# Patient Record
Sex: FEMALE | Race: WHITE | URBAN - METROPOLITAN AREA
[De-identification: names, ages, dates, MRNs, and addresses within clinical notes are randomized per-mention and may not be internally consistent; named-entity substitution may affect disease eponyms.]

---

## 2023-05-31 ENCOUNTER — HOSPITAL ENCOUNTER (OUTPATIENT)
Dept: MRI IMAGING | Age: 58
Discharge: HOME | End: 2023-06-02
Payer: COMMERCIAL

## 2023-05-31 DIAGNOSIS — M54.50 LOW BACK PAIN, UNSPECIFIED BACK PAIN LATERALITY, UNSPECIFIED CHRONICITY, UNSPECIFIED WHETHER SCIATICA PRESENT: ICD-10-CM

## 2023-05-31 DIAGNOSIS — M54.16 LUMBAR RADICULOPATHY: ICD-10-CM

## 2023-05-31 DIAGNOSIS — S32.020A CLOSED COMPRESSION FRACTURE OF L2 LUMBAR VERTEBRA, INITIAL ENCOUNTER (HCC): ICD-10-CM

## 2023-05-31 PROCEDURE — 72148 MRI LUMBAR SPINE W/O DYE: CPT

## 2023-06-06 ENCOUNTER — INITIAL CONSULT (OUTPATIENT)
Dept: NEUROSURGERY | Age: 58
End: 2023-06-06
Payer: COMMERCIAL

## 2023-06-06 VITALS
HEIGHT: 62 IN | TEMPERATURE: 97.6 F | DIASTOLIC BLOOD PRESSURE: 80 MMHG | BODY MASS INDEX: 36.8 KG/M2 | WEIGHT: 200 LBS | SYSTOLIC BLOOD PRESSURE: 128 MMHG

## 2023-06-06 DIAGNOSIS — M51.26 LUMBAR DISC HERNIATION: Primary | ICD-10-CM

## 2023-06-06 PROCEDURE — 99203 OFFICE O/P NEW LOW 30 MIN: CPT | Performed by: NEUROLOGICAL SURGERY

## 2023-06-06 RX ORDER — HYDROCHLOROTHIAZIDE 12.5 MG/1
CAPSULE, GELATIN COATED ORAL
COMMUNITY

## 2023-06-06 RX ORDER — CALCIUM CARBONATE 500(1250)
500 TABLET ORAL DAILY
COMMUNITY

## 2023-06-06 RX ORDER — ASPIRIN 81 MG/1
81 TABLET ORAL DAILY
COMMUNITY

## 2023-06-06 RX ORDER — MAGNESIUM 30 MG
30 TABLET ORAL 2 TIMES DAILY
COMMUNITY

## 2023-06-06 RX ORDER — ROSUVASTATIN CALCIUM 5 MG/1
TABLET, COATED ORAL
COMMUNITY

## 2023-06-06 ASSESSMENT — ENCOUNTER SYMPTOMS
NAUSEA: 0
DIARRHEA: 0
BACK PAIN: 1
EYE PAIN: 0
CONSTIPATION: 0
SHORTNESS OF BREATH: 0

## 2023-10-20 ENCOUNTER — LAB (OUTPATIENT)
Dept: LAB | Facility: LAB | Age: 58
End: 2023-10-20
Payer: COMMERCIAL

## 2023-10-20 DIAGNOSIS — E55.9 VITAMIN D DEFICIENCY, UNSPECIFIED: ICD-10-CM

## 2023-10-20 DIAGNOSIS — E78.2 MIXED HYPERLIPIDEMIA: ICD-10-CM

## 2023-10-20 DIAGNOSIS — I10 ESSENTIAL (PRIMARY) HYPERTENSION: ICD-10-CM

## 2023-10-20 DIAGNOSIS — Z00.00 ENCOUNTER FOR GENERAL ADULT MEDICAL EXAMINATION WITHOUT ABNORMAL FINDINGS: Primary | ICD-10-CM

## 2023-10-20 DIAGNOSIS — Z00.00 ENCOUNTER FOR GENERAL ADULT MEDICAL EXAMINATION WITHOUT ABNORMAL FINDINGS: ICD-10-CM

## 2023-10-20 LAB
25(OH)D3 SERPL-MCNC: 78 NG/ML (ref 30–100)
ALBUMIN SERPL BCP-MCNC: 4.4 G/DL (ref 3.4–5)
ALP SERPL-CCNC: 68 U/L (ref 33–110)
ALT SERPL W P-5'-P-CCNC: 17 U/L (ref 7–45)
ANION GAP SERPL CALC-SCNC: 12 MMOL/L (ref 10–20)
AST SERPL W P-5'-P-CCNC: 18 U/L (ref 9–39)
BILIRUB SERPL-MCNC: 0.8 MG/DL (ref 0–1.2)
BUN SERPL-MCNC: 15 MG/DL (ref 6–23)
CALCIUM SERPL-MCNC: 9.5 MG/DL (ref 8.6–10.3)
CHLORIDE SERPL-SCNC: 99 MMOL/L (ref 98–107)
CHOLEST SERPL-MCNC: 164 MG/DL (ref 0–199)
CHOLESTEROL/HDL RATIO: 3.5
CO2 SERPL-SCNC: 33 MMOL/L (ref 21–32)
CREAT SERPL-MCNC: 0.68 MG/DL (ref 0.5–1.05)
ERYTHROCYTE [DISTWIDTH] IN BLOOD BY AUTOMATED COUNT: 12.8 % (ref 11.5–14.5)
GFR SERPL CREATININE-BSD FRML MDRD: >90 ML/MIN/1.73M*2
GLUCOSE SERPL-MCNC: 122 MG/DL (ref 74–99)
HCT VFR BLD AUTO: 45.5 % (ref 36–46)
HDLC SERPL-MCNC: 46.4 MG/DL
HGB BLD-MCNC: 15 G/DL (ref 12–16)
LDLC SERPL CALC-MCNC: 91 MG/DL
MCH RBC QN AUTO: 31 PG (ref 26–34)
MCHC RBC AUTO-ENTMCNC: 33 G/DL (ref 32–36)
MCV RBC AUTO: 94 FL (ref 80–100)
NON HDL CHOLESTEROL: 118 MG/DL (ref 0–149)
NRBC BLD-RTO: 0 /100 WBCS (ref 0–0)
PLATELET # BLD AUTO: 215 X10*3/UL (ref 150–450)
PMV BLD AUTO: 10.6 FL (ref 7.5–11.5)
POTASSIUM SERPL-SCNC: 3.7 MMOL/L (ref 3.5–5.3)
PROT SERPL-MCNC: 7.1 G/DL (ref 6.4–8.2)
RBC # BLD AUTO: 4.84 X10*6/UL (ref 4–5.2)
SODIUM SERPL-SCNC: 140 MMOL/L (ref 136–145)
TRIGL SERPL-MCNC: 133 MG/DL (ref 0–149)
TSH SERPL-ACNC: 4.64 MIU/L (ref 0.44–3.98)
VLDL: 27 MG/DL (ref 0–40)
WBC # BLD AUTO: 4.3 X10*3/UL (ref 4.4–11.3)

## 2023-10-20 PROCEDURE — 84443 ASSAY THYROID STIM HORMONE: CPT

## 2023-10-20 PROCEDURE — 80053 COMPREHEN METABOLIC PANEL: CPT

## 2023-10-20 PROCEDURE — 82306 VITAMIN D 25 HYDROXY: CPT

## 2023-10-20 PROCEDURE — 85027 COMPLETE CBC AUTOMATED: CPT

## 2023-10-20 PROCEDURE — 80061 LIPID PANEL: CPT

## 2023-10-20 PROCEDURE — 36415 COLL VENOUS BLD VENIPUNCTURE: CPT

## 2023-10-26 PROBLEM — J30.9 ALLERGIC RHINITIS: Status: ACTIVE | Noted: 2023-10-26

## 2023-10-26 PROBLEM — A49.9 UTI (URINARY TRACT INFECTION), BACTERIAL: Status: ACTIVE | Noted: 2023-10-26

## 2023-10-26 PROBLEM — E55.9 VITAMIN D DEFICIENCY: Status: ACTIVE | Noted: 2023-10-26

## 2023-10-26 PROBLEM — H81.09 MENIERE DISEASE: Status: ACTIVE | Noted: 2023-10-26

## 2023-10-26 PROBLEM — H92.02 LEFT EAR PAIN: Status: ACTIVE | Noted: 2023-10-26

## 2023-10-26 PROBLEM — E66.9 OBESITY (BMI 30-39.9): Status: ACTIVE | Noted: 2023-10-26

## 2023-10-26 PROBLEM — N39.0 UTI (URINARY TRACT INFECTION), BACTERIAL: Status: ACTIVE | Noted: 2023-10-26

## 2023-10-26 PROBLEM — K21.9 GERD WITHOUT ESOPHAGITIS: Status: ACTIVE | Noted: 2023-10-26

## 2023-10-26 PROBLEM — F41.9 ANXIETY: Status: ACTIVE | Noted: 2023-10-26

## 2023-10-26 PROBLEM — M62.830 SPASM OF PARASPINAL MUSCLE: Status: ACTIVE | Noted: 2023-10-26

## 2023-10-26 PROBLEM — E78.2 MIXED HYPERLIPIDEMIA: Status: ACTIVE | Noted: 2023-10-26

## 2023-10-26 PROBLEM — S32.020A COMPRESSION FRACTURE OF L2 VERTEBRA (MULTI): Status: ACTIVE | Noted: 2023-10-26

## 2023-10-26 PROBLEM — M54.16 LUMBAR RADICULOPATHY, ACUTE: Status: ACTIVE | Noted: 2023-10-26

## 2023-10-26 PROBLEM — H71.90: Status: ACTIVE | Noted: 2023-10-26

## 2023-10-26 PROBLEM — I65.23 BILATERAL CAROTID ARTERY STENOSIS: Status: ACTIVE | Noted: 2023-10-26

## 2023-10-26 PROBLEM — H90.3 BILATERAL HIGH FREQUENCY SENSORINEURAL HEARING LOSS: Status: ACTIVE | Noted: 2023-10-26

## 2023-10-26 PROBLEM — D23.21 BENIGN NEOPLASM OF RIGHT EAR: Status: ACTIVE | Noted: 2023-10-26

## 2023-10-26 RX ORDER — HYDROCHLOROTHIAZIDE 12.5 MG/1
12.5 CAPSULE ORAL DAILY
COMMUNITY
End: 2024-01-02 | Stop reason: SDUPTHER

## 2023-10-26 RX ORDER — PREDNISONE 20 MG/1
20 TABLET ORAL
COMMUNITY
End: 2023-10-30 | Stop reason: WASHOUT

## 2023-10-26 RX ORDER — FLUTICASONE PROPIONATE 50 MCG
2 SPRAY, SUSPENSION (ML) NASAL DAILY
COMMUNITY
Start: 2021-03-30

## 2023-10-26 RX ORDER — ROSUVASTATIN CALCIUM 5 MG/1
1 TABLET, COATED ORAL NIGHTLY
COMMUNITY
Start: 2021-09-29 | End: 2024-02-19 | Stop reason: SDUPTHER

## 2023-10-26 RX ORDER — THEANINE 100 MG
1 CAPSULE ORAL EVERY OTHER DAY
COMMUNITY
Start: 2020-03-02

## 2023-10-26 RX ORDER — CALCIUM CARBONATE 300MG(750)
400 TABLET,CHEWABLE ORAL
COMMUNITY
Start: 2020-03-02

## 2023-10-26 RX ORDER — ASPIRIN 81 MG/1
1 TABLET ORAL DAILY
COMMUNITY

## 2023-10-26 RX ORDER — CALCIUM CARBONATE/VITAMIN D3 600MG-5MCG
1 TABLET ORAL DAILY
COMMUNITY
Start: 2020-03-02

## 2023-10-26 RX ORDER — PANTOPRAZOLE SODIUM 40 MG/1
1 TABLET, DELAYED RELEASE ORAL DAILY PRN
COMMUNITY

## 2023-10-26 RX ORDER — MONTELUKAST SODIUM 10 MG/1
10 TABLET ORAL DAILY
COMMUNITY
Start: 2019-10-24 | End: 2024-02-19 | Stop reason: SDUPTHER

## 2023-10-26 RX ORDER — ACETAMINOPHEN 500 MG
2000 TABLET ORAL DAILY
COMMUNITY

## 2023-10-30 ENCOUNTER — OFFICE VISIT (OUTPATIENT)
Dept: PRIMARY CARE | Facility: CLINIC | Age: 58
End: 2023-10-30
Payer: COMMERCIAL

## 2023-10-30 ENCOUNTER — PREP FOR PROCEDURE (OUTPATIENT)
Dept: OTOLARYNGOLOGY | Facility: CLINIC | Age: 58
End: 2023-10-30

## 2023-10-30 ENCOUNTER — OFFICE VISIT (OUTPATIENT)
Dept: OTOLARYNGOLOGY | Facility: CLINIC | Age: 58
End: 2023-10-30
Payer: COMMERCIAL

## 2023-10-30 VITALS
DIASTOLIC BLOOD PRESSURE: 100 MMHG | TEMPERATURE: 97.5 F | HEIGHT: 62 IN | WEIGHT: 190 LBS | HEART RATE: 86 BPM | BODY MASS INDEX: 34.96 KG/M2 | SYSTOLIC BLOOD PRESSURE: 140 MMHG

## 2023-10-30 DIAGNOSIS — R79.89 ABNORMAL TSH: Primary | ICD-10-CM

## 2023-10-30 DIAGNOSIS — N30.00 ACUTE CYSTITIS WITHOUT HEMATURIA: ICD-10-CM

## 2023-10-30 DIAGNOSIS — D23.21 BENIGN NEOPLASM OF SKIN OF RIGHT EAR AND EXTERNAL AUDITORY MEATUS: Primary | ICD-10-CM

## 2023-10-30 DIAGNOSIS — Z12.31 SCREENING MAMMOGRAM FOR BREAST CANCER: ICD-10-CM

## 2023-10-30 DIAGNOSIS — H61.899 LESION OF EAR CANAL: ICD-10-CM

## 2023-10-30 DIAGNOSIS — D23.21 BENIGN NEOPLASM OF SKIN OF RIGHT EAR AND EXTERNAL AUDITORY MEATUS: ICD-10-CM

## 2023-10-30 DIAGNOSIS — H92.02 LEFT EAR PAIN: Primary | ICD-10-CM

## 2023-10-30 PROCEDURE — 1036F TOBACCO NON-USER: CPT | Performed by: INTERNAL MEDICINE

## 2023-10-30 PROCEDURE — 99214 OFFICE O/P EST MOD 30 MIN: CPT | Performed by: OTOLARYNGOLOGY

## 2023-10-30 PROCEDURE — 3080F DIAST BP >= 90 MM HG: CPT | Performed by: INTERNAL MEDICINE

## 2023-10-30 PROCEDURE — 99214 OFFICE O/P EST MOD 30 MIN: CPT | Performed by: INTERNAL MEDICINE

## 2023-10-30 PROCEDURE — 3077F SYST BP >= 140 MM HG: CPT | Performed by: INTERNAL MEDICINE

## 2023-10-30 ASSESSMENT — ENCOUNTER SYMPTOMS
DEPRESSION: 0
OCCASIONAL FEELINGS OF UNSTEADINESS: 0
LOSS OF SENSATION IN FEET: 0

## 2023-10-30 ASSESSMENT — PATIENT HEALTH QUESTIONNAIRE - PHQ9
1. LITTLE INTEREST OR PLEASURE IN DOING THINGS: NOT AT ALL
2. FEELING DOWN, DEPRESSED OR HOPELESS: NOT AT ALL
SUM OF ALL RESPONSES TO PHQ9 QUESTIONS 1 AND 2: 0

## 2023-10-30 NOTE — PROGRESS NOTES
Ivy Ojeda is a 58 y.o. year old female patient with Earache     Patient presents to the office today for assessment of both left and right ear.  Patient with persistent pain in the left ear along with a history of cystic lesion in the right ear canal.  All other ENT issues are negative.  There have been no significant changes in past medical or past surgical histories except as mentioned.        Physical exam:   General appearance: No acute distress. Normal facies. Symmetric facial movement. No gross lesions of the face are noted.  Patient with cystic lesion in the right ear canal.  The ear canals patent and the tympanic membranes are intact without evidence of air-fluid levels, retraction, or congenital defects.  Anterior rhinoscopy notes essentially a midline nasal septum. Examination is noted for normal healthy mucosal membranes without any evidence of lesions, polyps, or exudate. The tongue is normally mobile. There are no lesions on the gingiva, buccal, or oral mucosa. There are no oral cavity masses.  The neck is negative for mass lymphadenopathy. The trachea and parotid are clear. The thyroid bed is grossly unremarkable. The salivary gland structures are grossly unremarkable.      Assessment/Plan   1.  Persistent left ear pain  2.  Right ear canal lesion    Patient seen in the office today for assessment of ears.  Patient with persistent left ear pain as well as cyst present in the right ear canal.  At this time the patient is going to be set up for CT IAC temporal bone for persistent left ear pain and possible prior history of cholesteatoma as well as set up for surgical excision of right ear canal lesion.  We will see the patient back to review CT.

## 2023-10-30 NOTE — PROGRESS NOTES
"In SUBJECTIVE:   Ivy Ojeda is a 58 y.o. female who presents for Follow-up (6 month).    HISTORY OF PRESENT ILLNESS:  Ivy Ojeda  is a pleasant 58-year-old female comes here for 6-month follow-up.  She is otherwise pretty healthy.  She does have hypercholesterolemia for which she is on rosuvastatin.  She has gastroesophageal reflux disorder which is well controlled by PPI.  She had recent blood work which did show slightly abnormal TSH.  I did not start any medications.  I like to repeat this lab including T3 and T4.  Otherwise she is doing well.  She needs her mammogram done.  Patient complained about this.  We will send her urine sample.            Review of Systems   Constitutional:  Negative for activity change and fever.   HENT:  Negative for hearing loss, nosebleeds and tinnitus.    Eyes:  Negative for redness.   Respiratory:  Negative for chest tightness and stridor.    Cardiovascular:  Negative for chest pain, palpitations and leg swelling.   Gastrointestinal:  Negative for blood in stool.   Endocrine: Negative for cold intolerance.   Genitourinary:  Negative for hematuria.   Musculoskeletal:  Negative for joint swelling.   Skin:  Negative for rash.   Neurological:  Negative for speech difficulty and light-headedness.   Psychiatric/Behavioral:  Negative for behavioral problems.        Visit Vitals  BP (!) 140/100 (BP Location: Left arm, Patient Position: Sitting, BP Cuff Size: Large adult)   Pulse 86   Temp 36.4 °C (97.5 °F) (Temporal)   Ht 1.575 m (5' 2\")   Wt 86.2 kg (190 lb)   BMI 34.75 kg/m²   Smoking Status Former   BSA 1.94 m²             Wt Readings from Last 10 Encounters:   10/30/23 86.2 kg (190 lb)   07/24/23 86.8 kg (191 lb 4 oz)   05/01/23 84.8 kg (187 lb)   01/26/23 82.1 kg (181 lb)   11/15/22 81.2 kg (179 lb)   04/11/22 79.3 kg (174 lb 14.4 oz)   02/09/22 76.8 kg (169 lb 4.8 oz)   12/08/21 72.6 kg (160 lb)   11/05/21 73.5 kg (162 lb)   03/30/21 84.5 kg (186 lb 4.8 oz)    "         Physical Exam  Constitutional:       General: She is not in acute distress.     Appearance: Normal appearance.   HENT:      Head: Normocephalic.      Right Ear: Tympanic membrane normal.      Left Ear: Tympanic membrane normal.      Mouth/Throat:      Mouth: Mucous membranes are moist.   Cardiovascular:      Rate and Rhythm: Normal rate and regular rhythm.      Heart sounds: No murmur heard.  Pulmonary:      Effort: No respiratory distress.   Abdominal:      Palpations: Abdomen is soft.   Musculoskeletal:      Cervical back: Neck supple.      Right lower leg: No edema.      Left lower leg: No edema.   Skin:     Findings: No rash.   Neurological:      General: No focal deficit present.      Mental Status: She is alert and oriented to person, place, and time.   Psychiatric:         Mood and Affect: Mood normal.            RECENT LABS:  Lab Results   Component Value Date    WBC 4.3 (L) 10/20/2023    HGB 15.0 10/20/2023    HCT 45.5 10/20/2023     10/20/2023    CHOL 164 10/20/2023    TRIG 133 10/20/2023    HDL 46.4 10/20/2023    ALT 17 10/20/2023    AST 18 10/20/2023     10/20/2023    K 3.7 10/20/2023    CL 99 10/20/2023    CREATININE 0.68 10/20/2023    BUN 15 10/20/2023    CO2 33 (H) 10/20/2023    TSH 4.64 (H) 10/20/2023    HGBA1C 5.6 11/13/2021       MEDICATIONS:   Current Outpatient Medications   Medication Instructions    aspirin (Adult Low Dose Aspirin) 81 mg EC tablet 1 tablet, oral, Daily    calcium carbonate-vitamin D3 600 mg-5 mcg (200 unit) tablet 1 tablet, oral, Daily    cholecalciferol (VITAMIN D-3) 2,000 Units, oral, Daily    fish oil concentrate (Omega-3) 120-180 mg capsule 1 capsule, oral, Daily    fluticasone (Flonase) 50 mcg/actuation nasal spray 2 sprays, Each Nostril, Daily    hydroCHLOROthiazide (MICROZIDE) 12.5 mg, oral, Daily    magnesium oxide (MAG-OX) 400 mg, oral, Every other day    montelukast (SINGULAIR) 10 mg, oral, Daily    pantoprazole (ProtoNix) 40 mg EC tablet 1  tablet, oral, Daily PRN    rosuvastatin (Crestor) 5 mg tablet 1 tablet, oral, Nightly    theanine 100 mg capsule 1 capsule, oral, Daily        TODAY'S VISIT  DX:   1. Abnormal TSH  TSH with reflex to Free T4 if abnormal    Triiodothyronine, Free      2. Screening mammogram for breast cancer  BI mammo bilateral screening tomosynthesis      3. Acute cystitis without hematuria  Urinalysis with Reflex Microscopic    Urinalysis with Reflex Microscopic    Microscopic Only, Urine             MEDICAL DECISION MAKING:  A. Recent lab work and relevant imaging studies have been reviewed.    B. Relevant correspondence/notes from specialty consultants were reviewed and discussed with patient.    C. The current active medical co morbidities have been considered.   D. Medication have been sent for refill.    E. Patient will continue with current medical therapy and plan.   F. I will see patient back in 3 months.

## 2023-10-31 LAB
APPEARANCE UR: ABNORMAL
BILIRUB UR STRIP.AUTO-MCNC: NEGATIVE MG/DL
COLOR UR: YELLOW
GLUCOSE UR STRIP.AUTO-MCNC: NEGATIVE MG/DL
KETONES UR STRIP.AUTO-MCNC: NEGATIVE MG/DL
LEUKOCYTE ESTERASE UR QL STRIP.AUTO: ABNORMAL
MUCOUS THREADS #/AREA URNS AUTO: NORMAL /LPF
NITRITE UR QL STRIP.AUTO: NEGATIVE
PH UR STRIP.AUTO: 6 [PH]
PROT UR STRIP.AUTO-MCNC: NEGATIVE MG/DL
RBC # UR STRIP.AUTO: NEGATIVE /UL
RBC #/AREA URNS AUTO: NORMAL /HPF
SP GR UR STRIP.AUTO: 1.02
SQUAMOUS #/AREA URNS AUTO: NORMAL /HPF
UROBILINOGEN UR STRIP.AUTO-MCNC: <2 MG/DL
WBC #/AREA URNS AUTO: NORMAL /HPF

## 2023-10-31 PROCEDURE — 81001 URINALYSIS AUTO W/SCOPE: CPT

## 2023-11-02 ASSESSMENT — ENCOUNTER SYMPTOMS
EYE REDNESS: 0
HEMATURIA: 0
JOINT SWELLING: 0
SPEECH DIFFICULTY: 0
STRIDOR: 0
CHEST TIGHTNESS: 0
PALPITATIONS: 0
BLOOD IN STOOL: 0
ACTIVITY CHANGE: 0
LIGHT-HEADEDNESS: 0
FEVER: 0

## 2023-11-17 ENCOUNTER — HOSPITAL ENCOUNTER (OUTPATIENT)
Dept: RADIOLOGY | Facility: HOSPITAL | Age: 58
Discharge: HOME | End: 2023-11-17
Payer: COMMERCIAL

## 2023-11-17 DIAGNOSIS — H92.02 LEFT EAR PAIN: ICD-10-CM

## 2023-11-17 PROCEDURE — 2550000001 HC RX 255 CONTRASTS: Performed by: PHYSICIAN ASSISTANT

## 2023-11-17 PROCEDURE — 70481 CT ORBIT/EAR/FOSSA W/DYE: CPT | Performed by: RADIOLOGY

## 2023-11-17 PROCEDURE — 70481 CT ORBIT/EAR/FOSSA W/DYE: CPT

## 2023-11-17 RX ADMIN — IOHEXOL 75 ML: 350 INJECTION, SOLUTION INTRAVENOUS at 16:00

## 2023-12-01 ENCOUNTER — HOSPITAL ENCOUNTER (OUTPATIENT)
Dept: RADIOLOGY | Facility: HOSPITAL | Age: 58
Discharge: HOME | End: 2023-12-01
Payer: COMMERCIAL

## 2023-12-01 DIAGNOSIS — Z12.31 SCREENING MAMMOGRAM FOR BREAST CANCER: ICD-10-CM

## 2023-12-01 PROCEDURE — 77063 BREAST TOMOSYNTHESIS BI: CPT

## 2023-12-01 PROCEDURE — 77063 BREAST TOMOSYNTHESIS BI: CPT | Performed by: RADIOLOGY

## 2023-12-01 PROCEDURE — 77067 SCR MAMMO BI INCL CAD: CPT | Performed by: RADIOLOGY

## 2023-12-19 RX ORDER — CYANOCOBALAMIN (VITAMIN B-12) 250 MCG
250 TABLET ORAL DAILY
COMMUNITY

## 2023-12-19 RX ORDER — VITAMIN E MIXED 400 UNIT
400 CAPSULE ORAL DAILY
COMMUNITY

## 2023-12-19 NOTE — PREPROCEDURE INSTRUCTIONS
Pre op instructions reviewed with pt including parking/hospital/procedure area location, have  on standby, and day of procedure routine.

## 2023-12-21 ENCOUNTER — HOSPITAL ENCOUNTER (OUTPATIENT)
Facility: HOSPITAL | Age: 58
Setting detail: OUTPATIENT SURGERY
Discharge: HOME | End: 2023-12-21
Attending: OTOLARYNGOLOGY | Admitting: OTOLARYNGOLOGY
Payer: COMMERCIAL

## 2023-12-21 VITALS
DIASTOLIC BLOOD PRESSURE: 86 MMHG | WEIGHT: 184.75 LBS | TEMPERATURE: 96.6 F | OXYGEN SATURATION: 98 % | BODY MASS INDEX: 34 KG/M2 | RESPIRATION RATE: 16 BRPM | HEIGHT: 62 IN | SYSTOLIC BLOOD PRESSURE: 158 MMHG | HEART RATE: 101 BPM

## 2023-12-21 DIAGNOSIS — D23.21 BENIGN NEOPLASM OF SKIN OF RIGHT EAR AND EXTERNAL AUDITORY MEATUS: ICD-10-CM

## 2023-12-21 PROCEDURE — 2500000001 HC RX 250 WO HCPCS SELF ADMINISTERED DRUGS (ALT 637 FOR MEDICARE OP)

## 2023-12-21 PROCEDURE — 88305 TISSUE EXAM BY PATHOLOGIST: CPT | Performed by: PATHOLOGY

## 2023-12-21 PROCEDURE — A4217 STERILE WATER/SALINE, 500 ML: HCPCS | Performed by: OTOLARYNGOLOGY

## 2023-12-21 PROCEDURE — 2720000007 HC OR 272 NO HCPCS: Performed by: OTOLARYNGOLOGY

## 2023-12-21 PROCEDURE — 2500000004 HC RX 250 GENERAL PHARMACY W/ HCPCS (ALT 636 FOR OP/ED): Performed by: OTOLARYNGOLOGY

## 2023-12-21 PROCEDURE — 7100000010 HC PHASE TWO TIME - EACH INCREMENTAL 1 MINUTE: Performed by: OTOLARYNGOLOGY

## 2023-12-21 PROCEDURE — 69145 REMOVE EAR CANAL LESION(S): CPT | Performed by: OTOLARYNGOLOGY

## 2023-12-21 PROCEDURE — 0753T DGTZ GLS MCRSCP SLD LEVEL IV: CPT | Mod: TC,SUR,ELYLAB | Performed by: OTOLARYNGOLOGY

## 2023-12-21 PROCEDURE — 3600000008 HC OR TIME - EACH INCREMENTAL 1 MINUTE - PROCEDURE LEVEL THREE: Performed by: OTOLARYNGOLOGY

## 2023-12-21 PROCEDURE — 7100000009 HC PHASE TWO TIME - INITIAL BASE CHARGE: Performed by: OTOLARYNGOLOGY

## 2023-12-21 PROCEDURE — 2500000005 HC RX 250 GENERAL PHARMACY W/O HCPCS: Performed by: OTOLARYNGOLOGY

## 2023-12-21 PROCEDURE — 3600000003 HC OR TIME - INITIAL BASE CHARGE - PROCEDURE LEVEL THREE: Performed by: OTOLARYNGOLOGY

## 2023-12-21 RX ORDER — CEFDINIR 300 MG/1
300 CAPSULE ORAL 2 TIMES DAILY
Qty: 20 CAPSULE | Refills: 0 | Status: SHIPPED | OUTPATIENT
Start: 2023-12-21 | End: 2023-12-31

## 2023-12-21 RX ORDER — BACITRACIN ZINC 500 UNIT/G
OINTMENT (GRAM) TOPICAL AS NEEDED
Status: DISCONTINUED | OUTPATIENT
Start: 2023-12-21 | End: 2023-12-21 | Stop reason: HOSPADM

## 2023-12-21 RX ORDER — SODIUM CHLORIDE 0.9 G/100ML
IRRIGANT IRRIGATION AS NEEDED
Status: DISCONTINUED | OUTPATIENT
Start: 2023-12-21 | End: 2023-12-21 | Stop reason: HOSPADM

## 2023-12-21 RX ORDER — LIDOCAINE HYDROCHLORIDE AND EPINEPHRINE 10; 10 MG/ML; UG/ML
INJECTION, SOLUTION INFILTRATION; PERINEURAL AS NEEDED
Status: DISCONTINUED | OUTPATIENT
Start: 2023-12-21 | End: 2023-12-21 | Stop reason: HOSPADM

## 2023-12-21 RX ORDER — HYDROGEN PEROXIDE 3 %
20 SOLUTION, NON-ORAL MISCELLANEOUS
COMMUNITY

## 2023-12-21 ASSESSMENT — ENCOUNTER SYMPTOMS
NEUROLOGICAL NEGATIVE: 1
CARDIOVASCULAR NEGATIVE: 1
RESPIRATORY NEGATIVE: 1
CONSTITUTIONAL NEGATIVE: 1

## 2023-12-21 ASSESSMENT — PAIN SCALES - GENERAL
PAINLEVEL_OUTOF10: 0 - NO PAIN
PAINLEVEL_OUTOF10: 0 - NO PAIN

## 2023-12-21 ASSESSMENT — COLUMBIA-SUICIDE SEVERITY RATING SCALE - C-SSRS
1. IN THE PAST MONTH, HAVE YOU WISHED YOU WERE DEAD OR WISHED YOU COULD GO TO SLEEP AND NOT WAKE UP?: NO
2. HAVE YOU ACTUALLY HAD ANY THOUGHTS OF KILLING YOURSELF?: NO
6. HAVE YOU EVER DONE ANYTHING, STARTED TO DO ANYTHING, OR PREPARED TO DO ANYTHING TO END YOUR LIFE?: NO

## 2023-12-21 ASSESSMENT — PAIN - FUNCTIONAL ASSESSMENT
PAIN_FUNCTIONAL_ASSESSMENT: 0-10
PAIN_FUNCTIONAL_ASSESSMENT: 0-10

## 2023-12-21 NOTE — OP NOTE
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  Anesthesia Staff: No anesthesia staff entered.  Estimated Blood Loss: Minimal mL  Intra-op Medications: * No intraprocedure medications in log *      Intraprocedure I/O Totals       None           Specimen:   ID Type Source Tests Collected by Time   1 : RIGHT DAVEY EXCISION Tissue SKIN EXCISION SURGICAL PATHOLOGY EXAM Daniel Salinas MD 2023 [id number]        Staff:   Circulator: Marge Ruggiero RN  Scrub Person: Dhara Sol         Drains and/or Catheters: * None in log *    Tourniquet Times:         Implants:     Findings: Prominent lesion removed in's entirety    Indications: Ivy Ojeda is an 58 y.o. 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     The patient was seen in the preoperative area. The risks, benefits, complications, treatment options, non-operative alternatives, expected recovery and outcomes were discussed with the patient. The possibilities of reaction to medication, pulmonary aspiration, injury to surrounding structures, bleeding, recurrent infection, the need for additional procedures, failure to diagnose a condition, and creating a complication requiring transfusion or operation were discussed with the patient. The patient concurred with  the proposed plan, giving informed consent.  The site of surgery was properly noted/marked if necessary per policy. The patient has been actively warmed in preoperative area. Preoperative antibiotics are not indicated. Venous thrombosis prophylaxis are not indicated.    Procedure Details:   The patient was taken the operating administered local prep drape and timeout in usual manner.  The base lesion was could identified.  Visible treated with lidocaine 1%-epinephrine 1-1000 circumferentially and this does involve the anterior inferior aspect the consul bowl medially.  The entire lesion was dissected free by circumferential sharp dissection with remove the entire lesion.  The resultant wound was irrigated and any bleeding controlled with judicious use of bipolar cautery.  A cottonball soaked with antibiotic ointment is applied in usual manner and the patient released and taken to recovery as there is no possible way to close this and it does not warrant a formal skin graft as it will heal in by secondary intention.  Patient released and taken to recovery stable condition.      Complications:  None; patient tolerated the procedure well.    Disposition: PACU - hemodynamically stable.  Condition: stable         Additional Details:     Attending Attestation: I was present and scrubbed for the entire procedure.    Daniel Salinas  Phone Number: [phone]

## 2023-12-21 NOTE — H&P
History Of Present Illness  Ivy Ojeda is a 58 y.o. female presenting with ear canal lesion right side for excision of same.     Past Medical History  She has a past medical history of Acute nasopharyngitis (common cold), Candidiasis, unspecified, Diverticulosis of intestine, part unspecified, without perforation or abscess without bleeding, Encounter for general adult medical examination without abnormal findings (2022), Encounter for screening for malignant neoplasm of colon, Erythema intertrigo, Frequent UTI, GERD (gastroesophageal reflux disease), High cholesterol, Hypertension, Other forms of angina pectoris, Other forms of dyspnea, Personal history of diseases of the skin and subcutaneous tissue, Personal history of other diseases of the nervous system and sense organs, Personal history of other diseases of the respiratory system, Personal history of other diseases of the respiratory system, Personal history of other specified conditions, Personal history of other specified conditions, Personal history of other specified conditions, Personal history of other specified conditions, Personal history of other specified conditions, Personal history of urinary (tract) infections, PONV (postoperative nausea and vomiting), Pruritus vulvae (2020), Shortness of breath, and Unspecified nonsuppurative otitis media, unspecified ear.    Surgical History  She has a past surgical history that includes Foot surgery (Left, 2020); Tonsillectomy (2020);  section, classic (2020); Cholecystectomy (10/31/2021); Cervical spine surgery (10/31/2021); Colonoscopy (10/31/2021); and Hysterectomy (2021).     Social History  She reports that she has quit smoking. Her smoking use included cigarettes. She has never used smokeless tobacco. She reports that she does not drink alcohol and does not use drugs.    Family History  Family History   Problem Relation Name Age of Onset    Diabetes Mother    "   Hypertension Mother      Hyperlipidemia Mother      Coronary artery disease Mother      Diabetes Father      Hypertension Father      Hyperlipidemia Father      Coronary artery disease Father          Allergies  Adhesive tape-silicones and Statins-hmg-coa reductase inhibitors    Review of Systems   Constitutional: Negative.    HENT: Negative.     Respiratory: Negative.     Cardiovascular: Negative.    Neurological: Negative.         Physical Exam  General appearance: No acute distress. Normal facies. Symmetric facial movement. No gross lesions of the face are noted.  The external ear structures appear normal. The ear canals patent and the tympanic membranes are intact without evidence of air-fluid levels, retraction, or congenital defects.  Anterior rhinoscopy notes essentially a midline nasal septum. Examination is noted for normal healthy mucosal membranes without any evidence of lesions, polyps, or exudate. The tongue is normally mobile. There are no lesions on the gingiva, buccal, or oral mucosa. There are no oral cavity masses.  The neck is negative for mass lymphadenopathy. The trachea and parotid are clear. The thyroid bed is grossly unremarkable. The salivary gland structures are grossly unremarkable.  Last Recorded Vitals  Blood pressure 165/89, pulse 96, temperature 36.5 °C (97.7 °F), resp. rate 16, height 1.575 m (5' 2\"), weight 83.8 kg (184 lb 11.9 oz), SpO2 96 %.    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

## 2023-12-22 ENCOUNTER — OFFICE VISIT (OUTPATIENT)
Dept: OTOLARYNGOLOGY | Facility: CLINIC | Age: 58
End: 2023-12-22
Payer: COMMERCIAL

## 2023-12-22 DIAGNOSIS — D23.21 BENIGN NEOPLASM OF SKIN OF RIGHT EAR AND EXTERNAL AUDITORY MEATUS: Primary | ICD-10-CM

## 2023-12-22 PROCEDURE — 99024 POSTOP FOLLOW-UP VISIT: CPT | Performed by: OTOLARYNGOLOGY

## 2023-12-22 PROCEDURE — 1036F TOBACCO NON-USER: CPT | Performed by: OTOLARYNGOLOGY

## 2023-12-22 NOTE — PROGRESS NOTES
Patient returns following uneventful resection of right conchal bowl lesion yesterday.  Here for wound check.  Doing fine.  No worrisome postop issue.    Exam  Exam is confined to the right ear where the dressing/cottonball was removed and then reapplied.  Wound is healing appropriately.  We see nothing worrisome.    Assessment and plan:  Status post resection neoplasm right ear as described yesterday.  Recheck with me 2 weeks sooner with issue.  All questions were answered in this regard accordingly.

## 2024-01-02 DIAGNOSIS — I10 BENIGN ESSENTIAL HYPERTENSION: Primary | ICD-10-CM

## 2024-01-03 RX ORDER — HYDROCHLOROTHIAZIDE 12.5 MG/1
12.5 CAPSULE ORAL DAILY
Qty: 90 CAPSULE | Refills: 1 | Status: SHIPPED | OUTPATIENT
Start: 2024-01-03 | End: 2024-04-19 | Stop reason: SDUPTHER

## 2024-01-05 ENCOUNTER — OFFICE VISIT (OUTPATIENT)
Dept: OTOLARYNGOLOGY | Facility: CLINIC | Age: 59
End: 2024-01-05
Payer: COMMERCIAL

## 2024-01-05 DIAGNOSIS — D23.21 BENIGN NEOPLASM OF SKIN OF RIGHT EAR AND EXTERNAL AUDITORY MEATUS: Primary | ICD-10-CM

## 2024-01-05 PROCEDURE — 1036F TOBACCO NON-USER: CPT | Performed by: OTOLARYNGOLOGY

## 2024-01-05 PROCEDURE — 99024 POSTOP FOLLOW-UP VISIT: CPT | Performed by: OTOLARYNGOLOGY

## 2024-01-05 NOTE — PROGRESS NOTES
Patient returns.  Seeing her back today following resection/ablation of canal lesion.  She is doing fine.  No worrisome postop issue.  Will complete antibiotics in the next few days.  Utilize the antibiotic ointment on the wound.  No worrisome wound issues.  There have been no significant changes in past medical or past surgical histories except as mentioned.    Exam:    Dedicated examination of the ear looks okay.  There is still a lot of healing going on and that is expected based on the volume of deficiency and deficit of tissue present at the time of the resection as it is not possible to close this region on the conchal bowl.  Overall it is healing as expected.    Assessment and plan:    Status post resection canal/conchal bowl lesion.  Doing fine.  Pathology still pending.  Awaiting that accordingly.  We will see what that shows and proceed accordingly.  Recheck with me in a month sooner with issue.  All questions were answered in this regard accordingly.

## 2024-01-08 LAB
LABORATORY COMMENT REPORT: NORMAL
PATH REPORT.FINAL DX SPEC: NORMAL
PATH REPORT.GROSS SPEC: NORMAL
PATH REPORT.RELEVANT HX SPEC: NORMAL
PATH REPORT.TOTAL CANCER: NORMAL

## 2024-01-16 ENCOUNTER — HOSPITAL ENCOUNTER (OUTPATIENT)
Dept: RADIOLOGY | Facility: HOSPITAL | Age: 59
Discharge: HOME | End: 2024-01-16
Payer: COMMERCIAL

## 2024-01-16 DIAGNOSIS — G90.01 CAROTID SINUS SYNCOPE: ICD-10-CM

## 2024-01-16 PROCEDURE — 93880 EXTRACRANIAL BILAT STUDY: CPT | Performed by: RADIOLOGY

## 2024-01-16 PROCEDURE — 93880 EXTRACRANIAL BILAT STUDY: CPT

## 2024-01-18 ENCOUNTER — TELEMEDICINE (OUTPATIENT)
Dept: VASCULAR SURGERY | Facility: CLINIC | Age: 59
End: 2024-01-18
Payer: COMMERCIAL

## 2024-01-18 DIAGNOSIS — I65.23 BILATERAL CAROTID ARTERY STENOSIS: Primary | ICD-10-CM

## 2024-01-18 PROCEDURE — 99213 OFFICE O/P EST LOW 20 MIN: CPT | Performed by: SURGERY

## 2024-01-18 NOTE — PROGRESS NOTES
Vascular Surgery Clinic Note    CC: carotid    HPI:  Ivy Ojeda is 58 y.o. female with history of carotid stenosis. Most recent duplex shows no significant stenosis on either side. No stroke/tia. She does not smoke.     Medical History:   has a past medical history of Acute nasopharyngitis (common cold), Candidiasis, unspecified, Diverticulosis of intestine, part unspecified, without perforation or abscess without bleeding, Encounter for general adult medical examination without abnormal findings (11/29/2022), Encounter for screening for malignant neoplasm of colon, Erythema intertrigo, Frequent UTI, GERD (gastroesophageal reflux disease), High cholesterol, Hypertension, Other forms of angina pectoris, Other forms of dyspnea, Personal history of diseases of the skin and subcutaneous tissue, Personal history of other diseases of the nervous system and sense organs, Personal history of other diseases of the respiratory system, Personal history of other diseases of the respiratory system, Personal history of other specified conditions, Personal history of other specified conditions, Personal history of other specified conditions, Personal history of other specified conditions, Personal history of other specified conditions, Personal history of urinary (tract) infections, PONV (postoperative nausea and vomiting), Pruritus vulvae (03/02/2020), Shortness of breath, and Unspecified nonsuppurative otitis media, unspecified ear.    Meds:   Current Outpatient Medications on File Prior to Visit   Medication Sig Dispense Refill    aspirin (Adult Low Dose Aspirin) 81 mg EC tablet Take 1 tablet (81 mg) by mouth once daily.      calcium carbonate-vitamin D3 600 mg-5 mcg (200 unit) tablet Take 1 tablet by mouth once daily.      cholecalciferol (Vitamin D-3) 50 mcg (2,000 unit) capsule Take 1 capsule (50 mcg) by mouth early in the morning..      cyanocobalamin (Vitamin B-12) 250 mcg tablet Take 1 tablet (250 mcg) by mouth once  daily.      esomeprazole (NexIUM) 20 mg DR capsule Take 1 capsule (20 mg) by mouth once daily in the morning. Take before meals. Do not open capsule.      fish oil concentrate (Omega-3) 120-180 mg capsule Take 1 capsule (1 g) by mouth once daily.      fluticasone (Flonase) 50 mcg/actuation nasal spray Administer 2 sprays into each nostril once daily.      hydroCHLOROthiazide (Microzide) 12.5 mg capsule Take 1 capsule (12.5 mg) by mouth once daily. 90 capsule 1    magnesium oxide (Mag-Ox) 400 mg tablet Take 1 tablet (400 mg) by mouth. Every other day      montelukast (Singulair) 10 mg tablet Take 1 tablet (10 mg) by mouth once daily.      pantoprazole (ProtoNix) 40 mg EC tablet Take 1 tablet (40 mg) by mouth once daily as needed.      rosuvastatin (Crestor) 5 mg tablet Take 1 tablet (5 mg) by mouth once daily at bedtime.      theanine 100 mg capsule Take 1 capsule by mouth once daily.      vitamin E 180 mg (400 unit) capsule Take 1 capsule (400 Units) by mouth once daily.       No current facility-administered medications on file prior to visit.        Allergies:   Allergies   Allergen Reactions    Adhesive Tape-Silicones Unknown    Statins-Hmg-Coa Reductase Inhibitors Unknown     Able to take Crestor without any issues per pt       SH:    Social Determinants of Health     Tobacco Use: Medium Risk (12/21/2023)    Patient History     Smoking Tobacco Use: Former     Smokeless Tobacco Use: Never     Passive Exposure: Not on file   Alcohol Use: Not on file   Financial Resource Strain: Not on file   Food Insecurity: Not on file   Transportation Needs: Not on file   Physical Activity: Not on file   Stress: Not on file   Social Connections: Not on file   Intimate Partner Violence: Not on file   Depression: Not at risk (10/30/2023)    PHQ-2     PHQ-2 Score: 0   Housing Stability: Not on file   Utilities: Not on file   Digital Equity: Not on file        FH:  Family History   Problem Relation Name Age of Onset    Diabetes  Mother      Hypertension Mother      Hyperlipidemia Mother      Coronary artery disease Mother      Diabetes Father      Hypertension Father      Hyperlipidemia Father      Coronary artery disease Father          ROS:  All systems were reviewed and are negative except as per HPI.    Objective:  Vitals:  There were no vitals filed for this visit.     Assessment & Plan:  Ivy BOYER Frame is 58 y.o. female with history of carotid stenosis. Rtc 3-5 yrs for repeat duplex.      I spent a total of 20 minutes on the day of the visit.         Philipp Charlton M.D.

## 2024-01-22 ENCOUNTER — APPOINTMENT (OUTPATIENT)
Dept: VASCULAR SURGERY | Facility: CLINIC | Age: 59
End: 2024-01-22
Payer: COMMERCIAL

## 2024-02-05 ENCOUNTER — OFFICE VISIT (OUTPATIENT)
Dept: OTOLARYNGOLOGY | Facility: CLINIC | Age: 59
End: 2024-02-05
Payer: COMMERCIAL

## 2024-02-05 DIAGNOSIS — D23.21 BENIGN NEOPLASM OF SKIN OF RIGHT EAR AND EXTERNAL AUDITORY MEATUS: Primary | ICD-10-CM

## 2024-02-05 PROCEDURE — 1036F TOBACCO NON-USER: CPT | Performed by: OTOLARYNGOLOGY

## 2024-02-05 PROCEDURE — 99024 POSTOP FOLLOW-UP VISIT: CPT | Performed by: OTOLARYNGOLOGY

## 2024-02-05 NOTE — PROGRESS NOTES
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  Doing very well.  Also has a prior history of possible cholesteatoma left ear wants to have checked.  All remaining ENT inquiry is clear.  No other change in past medical surgical history.    Physical exam:  No acute distress.  Examination of the left ear is normal. The middle ear, tympanic membrane, external auditory canal, and external ear itself are grossly unremarkable without evidence of active disease.  Right ear canal looks excellent.  The resection site is healing very nicely.  We see nothing worrisome.  Nasal exam is clear anteriorly. The septum is relatively straight and the nasal mucosa is grossly unremarkable without evidence of any worrisome infection or polyp or mass. The oral cavity and oropharynx are unremarkable. There is no evidence of any gross lesion or mucosal irregularity throughout the structures. The neck is negative for mass or lymphadenopathy. The trachea and parotid region are clear free of obvious mass or tumor. Facial structures are grossly otherwise unremarkable as well.    Assessment and plan:  Doing very well following resection very large lesion involving the right conchal bowl.  Area looks very good.  We favor observation.  Recheck with me in several months sooner with issue.  All questions were answered in this regard accordingly.

## 2024-02-14 ENCOUNTER — HOSPITAL ENCOUNTER (OUTPATIENT)
Dept: RADIOLOGY | Facility: CLINIC | Age: 59
Discharge: HOME | End: 2024-02-14
Payer: COMMERCIAL

## 2024-02-14 ENCOUNTER — OFFICE VISIT (OUTPATIENT)
Dept: ORTHOPEDIC SURGERY | Facility: CLINIC | Age: 59
End: 2024-02-14
Payer: COMMERCIAL

## 2024-02-14 DIAGNOSIS — M25.512 ACUTE PAIN OF LEFT SHOULDER: ICD-10-CM

## 2024-02-14 DIAGNOSIS — M25.812 IMPINGEMENT OF LEFT SHOULDER: Primary | ICD-10-CM

## 2024-02-14 PROCEDURE — 99213 OFFICE O/P EST LOW 20 MIN: CPT | Performed by: ORTHOPAEDIC SURGERY

## 2024-02-14 PROCEDURE — 73030 X-RAY EXAM OF SHOULDER: CPT | Mod: LT

## 2024-02-14 PROCEDURE — 1036F TOBACCO NON-USER: CPT | Performed by: ORTHOPAEDIC SURGERY

## 2024-02-14 PROCEDURE — 99203 OFFICE O/P NEW LOW 30 MIN: CPT | Performed by: ORTHOPAEDIC SURGERY

## 2024-02-14 PROCEDURE — 73030 X-RAY EXAM OF SHOULDER: CPT | Mod: LEFT SIDE | Performed by: ORTHOPAEDIC SURGERY
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
## 2024-02-19 DIAGNOSIS — J30.9 ALLERGIC RHINITIS, UNSPECIFIED SEASONALITY, UNSPECIFIED TRIGGER: Primary | ICD-10-CM

## 2024-02-19 DIAGNOSIS — E78.2 MIXED HYPERLIPIDEMIA: ICD-10-CM

## 2024-02-21 RX ORDER — MONTELUKAST SODIUM 10 MG/1
10 TABLET ORAL DAILY
Qty: 90 TABLET | Refills: 1 | Status: SHIPPED | OUTPATIENT
Start: 2024-02-21 | End: 2024-04-19 | Stop reason: SDUPTHER

## 2024-02-21 RX ORDER — ROSUVASTATIN CALCIUM 5 MG/1
5 TABLET, COATED ORAL NIGHTLY
Qty: 90 TABLET | Refills: 1 | Status: SHIPPED | OUTPATIENT
Start: 2024-02-21 | End: 2024-04-19 | Stop reason: SDUPTHER

## 2024-04-01 ENCOUNTER — LAB (OUTPATIENT)
Dept: LAB | Facility: LAB | Age: 59
End: 2024-04-01
Payer: COMMERCIAL

## 2024-04-01 DIAGNOSIS — R79.89 ABNORMAL TSH: ICD-10-CM

## 2024-04-01 LAB
T3FREE SERPL-MCNC: 3.3 PG/ML (ref 2.3–4.2)
TSH SERPL-ACNC: 3.17 MIU/L (ref 0.44–3.98)

## 2024-04-01 PROCEDURE — 84481 FREE ASSAY (FT-3): CPT

## 2024-04-01 PROCEDURE — 84443 ASSAY THYROID STIM HORMONE: CPT

## 2024-04-01 PROCEDURE — 36415 COLL VENOUS BLD VENIPUNCTURE: CPT

## 2024-04-19 ENCOUNTER — OFFICE VISIT (OUTPATIENT)
Dept: PRIMARY CARE | Facility: CLINIC | Age: 59
End: 2024-04-19
Payer: COMMERCIAL

## 2024-04-19 VITALS
BODY MASS INDEX: 34.78 KG/M2 | OXYGEN SATURATION: 97 % | DIASTOLIC BLOOD PRESSURE: 84 MMHG | HEART RATE: 83 BPM | SYSTOLIC BLOOD PRESSURE: 120 MMHG | WEIGHT: 189 LBS | TEMPERATURE: 97.8 F | HEIGHT: 62 IN

## 2024-04-19 DIAGNOSIS — Z00.00 ENCOUNTER FOR WELLNESS EXAMINATION: ICD-10-CM

## 2024-04-19 DIAGNOSIS — I10 BENIGN ESSENTIAL HYPERTENSION: ICD-10-CM

## 2024-04-19 DIAGNOSIS — S32.020D COMPRESSION FRACTURE OF L2 VERTEBRA WITH ROUTINE HEALING, SUBSEQUENT ENCOUNTER: ICD-10-CM

## 2024-04-19 DIAGNOSIS — R39.9 URINARY TRACT INFECTION SYMPTOMS: ICD-10-CM

## 2024-04-19 DIAGNOSIS — R94.6 BORDERLINE ABNORMAL TFTS: ICD-10-CM

## 2024-04-19 DIAGNOSIS — M06.9 RHEUMATOID ARTHRITIS OF BOTH HIPS, UNSPECIFIED WHETHER RHEUMATOID FACTOR PRESENT (MULTI): ICD-10-CM

## 2024-04-19 DIAGNOSIS — E78.2 MIXED HYPERLIPIDEMIA: ICD-10-CM

## 2024-04-19 DIAGNOSIS — I10 ESSENTIAL (PRIMARY) HYPERTENSION: ICD-10-CM

## 2024-04-19 DIAGNOSIS — Z12.31 SCREENING MAMMOGRAM FOR BREAST CANCER: Primary | ICD-10-CM

## 2024-04-19 DIAGNOSIS — E78.2 MIXED DYSLIPIDEMIA: ICD-10-CM

## 2024-04-19 DIAGNOSIS — E55.9 VITAMIN D INSUFFICIENCY: ICD-10-CM

## 2024-04-19 DIAGNOSIS — J30.9 ALLERGIC RHINITIS, UNSPECIFIED SEASONALITY, UNSPECIFIED TRIGGER: ICD-10-CM

## 2024-04-19 PROBLEM — M62.830 SPASM OF PARASPINAL MUSCLE: Status: RESOLVED | Noted: 2023-10-26 | Resolved: 2024-04-19

## 2024-04-19 PROBLEM — A49.9 UTI (URINARY TRACT INFECTION), BACTERIAL: Status: RESOLVED | Noted: 2023-10-26 | Resolved: 2024-04-19

## 2024-04-19 PROBLEM — H92.02 LEFT EAR PAIN: Status: RESOLVED | Noted: 2023-10-26 | Resolved: 2024-04-19

## 2024-04-19 PROBLEM — F41.9 ANXIETY: Status: RESOLVED | Noted: 2023-10-26 | Resolved: 2024-04-19

## 2024-04-19 PROBLEM — N39.0 UTI (URINARY TRACT INFECTION), BACTERIAL: Status: RESOLVED | Noted: 2023-10-26 | Resolved: 2024-04-19

## 2024-04-19 PROCEDURE — 3074F SYST BP LT 130 MM HG: CPT | Performed by: INTERNAL MEDICINE

## 2024-04-19 PROCEDURE — 99214 OFFICE O/P EST MOD 30 MIN: CPT | Performed by: INTERNAL MEDICINE

## 2024-04-19 PROCEDURE — 3079F DIAST BP 80-89 MM HG: CPT | Performed by: INTERNAL MEDICINE

## 2024-04-19 RX ORDER — ROSUVASTATIN CALCIUM 5 MG/1
5 TABLET, COATED ORAL NIGHTLY
Qty: 90 TABLET | Refills: 1 | Status: SHIPPED | OUTPATIENT
Start: 2024-04-19

## 2024-04-19 RX ORDER — MONTELUKAST SODIUM 10 MG/1
10 TABLET ORAL DAILY
Qty: 90 TABLET | Refills: 1 | Status: SHIPPED | OUTPATIENT
Start: 2024-04-19

## 2024-04-19 RX ORDER — HYDROCHLOROTHIAZIDE 12.5 MG/1
12.5 CAPSULE ORAL DAILY
Qty: 90 CAPSULE | Refills: 1 | Status: SHIPPED | OUTPATIENT
Start: 2024-04-19

## 2024-04-19 ASSESSMENT — PATIENT HEALTH QUESTIONNAIRE - PHQ9
SUM OF ALL RESPONSES TO PHQ9 QUESTIONS 1 AND 2: 0
1. LITTLE INTEREST OR PLEASURE IN DOING THINGS: NOT AT ALL
2. FEELING DOWN, DEPRESSED OR HOPELESS: NOT AT ALL

## 2024-04-19 ASSESSMENT — ENCOUNTER SYMPTOMS
LOSS OF SENSATION IN FEET: 0
DEPRESSION: 0
OCCASIONAL FEELINGS OF UNSTEADINESS: 0

## 2024-04-21 ASSESSMENT — ENCOUNTER SYMPTOMS
JOINT SWELLING: 0
BLOOD IN STOOL: 0
ACTIVITY CHANGE: 0
FEVER: 0
LIGHT-HEADEDNESS: 0
PALPITATIONS: 0
STRIDOR: 0
CHEST TIGHTNESS: 0
EYE REDNESS: 0
HEMATURIA: 0
SPEECH DIFFICULTY: 0

## 2024-04-21 NOTE — PROGRESS NOTES
"CHIEF COMPLAINT:    Chief Complaint   Patient presents with    Follow-up     PATIENT HERE FOR 6 MONTH FOLLOW-UP.         HISTORY OF PRESENT ILLNESS:  Ivy Ojeda  is a pleasant 58-year-old female otherwise healthy comes here for routine 6-month follow-up.  She is doing well.  Her blood pressure is well-controlled.  She needs her annual blood work.  She is also needs her medications refilled.  She is due for her mammogram and DEXA scan.  Today patient does not have any acute medical complaint.  Her medication have been sent for refill.        Review of Systems   Constitutional:  Negative for activity change and fever.   HENT:  Negative for hearing loss, nosebleeds and tinnitus.    Eyes:  Negative for redness.   Respiratory:  Negative for chest tightness and stridor.    Cardiovascular:  Negative for chest pain, palpitations and leg swelling.   Gastrointestinal:  Negative for blood in stool.   Endocrine: Negative for cold intolerance.   Genitourinary:  Negative for hematuria.   Musculoskeletal:  Negative for joint swelling.   Skin:  Negative for rash.   Neurological:  Negative for speech difficulty and light-headedness.   Psychiatric/Behavioral:  Negative for behavioral problems.      Visit Vitals  /84 (BP Location: Left arm, Patient Position: Sitting, BP Cuff Size: Adult)   Pulse 83   Temp 36.6 °C (97.8 °F) (Temporal)   Ht 1.575 m (5' 2\")   Wt 85.7 kg (189 lb)   SpO2 97%   BMI 34.57 kg/m²   OB Status Hysterectomy   Smoking Status Former   BSA 1.94 m²         Wt Readings from Last 10 Encounters:   04/19/24 85.7 kg (189 lb)   12/21/23 83.8 kg (184 lb 11.9 oz)   10/30/23 86.2 kg (190 lb)   07/24/23 86.8 kg (191 lb 4 oz)   05/01/23 84.8 kg (187 lb)   01/26/23 82.1 kg (181 lb)   11/15/22 81.2 kg (179 lb)   04/11/22 79.3 kg (174 lb 14.4 oz)   02/09/22 76.8 kg (169 lb 4.8 oz)   12/08/21 72.6 kg (160 lb)       Physical Exam  Constitutional:       General: She is not in acute distress.     Appearance: Normal " appearance.   HENT:      Head: Normocephalic.      Right Ear: Tympanic membrane normal.      Left Ear: Tympanic membrane normal.      Mouth/Throat:      Mouth: Mucous membranes are moist.   Cardiovascular:      Rate and Rhythm: Normal rate and regular rhythm.      Heart sounds: No murmur heard.  Pulmonary:      Effort: No respiratory distress.   Abdominal:      Palpations: Abdomen is soft.   Musculoskeletal:      Cervical back: Neck supple.      Right lower leg: No edema.      Left lower leg: No edema.   Skin:     Findings: No rash.   Neurological:      General: No focal deficit present.      Mental Status: She is alert and oriented to person, place, and time.   Psychiatric:         Mood and Affect: Mood normal.          RECENT LABS:  Lab Results   Component Value Date    WBC 4.3 (L) 10/20/2023    HGB 15.0 10/20/2023    HCT 45.5 10/20/2023     10/20/2023    CHOL 164 10/20/2023    TRIG 133 10/20/2023    HDL 46.4 10/20/2023    ALT 17 10/20/2023    AST 18 10/20/2023     10/20/2023    K 3.7 10/20/2023    CL 99 10/20/2023    CREATININE 0.68 10/20/2023    BUN 15 10/20/2023    CO2 33 (H) 10/20/2023    TSH 3.17 04/01/2024    HGBA1C 5.6 11/13/2021       IMAGING:  === 02/14/24 ===    XR SHOULDER 2+ VIEWS LEFT   === 11/17/23 ===    CT INTERNAL AUDITORY CANALS/POSTERIOR FOSSA W IV CONTRAST    - Impression -  Mastoid air cells and middle ear clear bilaterally.    I personally reviewed the images/study and I agree with the findings  as stated. This study was interpreted at [place], [place].    MACRO:  None    Signed by: Marsha Pendleton 11/20/2023 11:23 AM  Dictation workstation:   [id number]     MEDICATIONS:   Current Outpatient Medications   Medication Instructions    aspirin (Adult Low Dose Aspirin) 81 mg EC tablet 1 tablet, oral, Daily    calcium carbonate-vitamin D3 600 mg-5 mcg (200 unit) tablet 1 tablet, oral, Daily    cholecalciferol (VITAMIN D-3) 2,000 Units,  oral, Daily    cyanocobalamin (VITAMIN B-12) 250 mcg, oral, Daily    esomeprazole (NEXIUM) 20 mg, oral, Daily before breakfast, Do not open capsule.    fish oil concentrate (Omega-3) 120-180 mg capsule 1 capsule, oral, Daily    fluticasone (Flonase) 50 mcg/actuation nasal spray 2 sprays, Each Nostril, Daily    hydroCHLOROthiazide (MICROZIDE) 12.5 mg, oral, Daily    magnesium oxide (MAG-OX) 400 mg, oral, Every other day    montelukast (SINGULAIR) 10 mg, oral, Daily    pantoprazole (ProtoNix) 40 mg EC tablet 1 tablet, oral, Daily PRN    rosuvastatin (CRESTOR) 5 mg, oral, Nightly    theanine 100 mg capsule 1 capsule, oral, Every other day    vitamin E 400 Units, oral, Daily        TODAY'S VISIT  DX:   1. Screening mammogram for breast cancer  BI mammo bilateral diagnostic    BI mammo bilateral screening tomosynthesis      2. Benign essential hypertension  hydroCHLOROthiazide (Microzide) 12.5 mg capsule      3. Allergic rhinitis, unspecified seasonality, unspecified trigger  montelukast (Singulair) 10 mg tablet      4. Mixed hyperlipidemia  rosuvastatin (Crestor) 5 mg tablet      5. Rheumatoid arthritis of both hips, unspecified whether rheumatoid factor present (Multi)        6. Compression fracture of L2 vertebra with routine healing, subsequent encounter  XR DEXA bone density      7. Essential (primary) hypertension  CBC and Auto Differential      8. Encounter for wellness examination  Comprehensive Metabolic Panel    Lipid Panel    TSH with reflex to Free T4 if abnormal    Urinalysis with Reflex Microscopic      9. Mixed dyslipidemia  Lipid Panel      10. Vitamin D insufficiency  Vitamin D 25-Hydroxy,Total (for eval of Vitamin D levels)      11. Urinary tract infection symptoms  Urinalysis with Reflex Microscopic      12. Borderline abnormal TFTs  Triiodothyronine, Free           MEDICAL DECISION MAKING:  - The current and active medical co morbidities have been considered.   - Recent lab work and relevant imaging  studies have been reviewed.    - Relevant correspondence/notes from other specialty consultants were reviewed.    - Patient was given treatment as per above plan.   - Medication have been sent for refill.    - Next Follow up in 6

## 2024-05-02 ENCOUNTER — HOSPITAL ENCOUNTER (OUTPATIENT)
Dept: RADIOLOGY | Facility: CLINIC | Age: 59
Discharge: HOME | End: 2024-05-02
Payer: COMMERCIAL

## 2024-05-02 DIAGNOSIS — S32.020D COMPRESSION FRACTURE OF L2 VERTEBRA WITH ROUTINE HEALING, SUBSEQUENT ENCOUNTER: ICD-10-CM

## 2024-05-02 PROCEDURE — 77080 DXA BONE DENSITY AXIAL: CPT

## 2024-07-08 ENCOUNTER — APPOINTMENT (OUTPATIENT)
Dept: OTOLARYNGOLOGY | Facility: CLINIC | Age: 59
End: 2024-07-08
Payer: COMMERCIAL

## 2024-07-08 DIAGNOSIS — H61.899 LESION OF EAR CANAL: Primary | ICD-10-CM

## 2024-07-08 PROCEDURE — 99212 OFFICE O/P EST SF 10 MIN: CPT | Performed by: OTOLARYNGOLOGY

## 2024-07-08 PROCEDURE — 11442 EXC FACE-MM B9+MARG 1.1-2 CM: CPT | Performed by: OTOLARYNGOLOGY

## 2024-07-08 NOTE — PROGRESS NOTES
The patient returns.  We are seeing her back for follow-up check history of previous resection of the lesion from the ear on the right side.  She believes she may have a form 1 on the left side.  All remaining ENT inquiry is clear.  No other change in past medical surgical history.    Physical exam:  No acute distress.  Examination of the right ear is unremarkable. There is no evidence of middle ear effusion or abnormality of the external auditory canal, tympanic membrane, and external ear itself.  Left ear has evidence of a small lesion involving the conchal bowl on the left side.     Procedure:   The patient had that area prepped with alcohol and the base infiltrated with lidocaine 1%-epinephrine 1/100,000 after verbal informed consent.  Lesion was taken down in's entirety with a cup forcep at the base cauterized with silver nitrate followed by antibiotic ointment application.  She tolerated this well.    Nasal exam is clear anteriorly. The septum is relatively straight and the nasal mucosa is grossly unremarkable without evidence of any worrisome infection or polyp or mass. The oral cavity and oropharynx are unremarkable. There is no evidence of any gross lesion or mucosal irregularity throughout the structures. The neck is negative for mass or lymphadenopathy. The trachea and parotid region are clear free of obvious mass or tumor. Facial structures are grossly otherwise unremarkable as well.    Assessment and plan:  Status post resection of a lesion of the right ear now status post excision of a similar-appearing lesion in the left ear.  I will see her back for recheck in 3 to 4 months.  All questions were answered in this regard accordingly.

## 2024-07-09 PROCEDURE — 88305 TISSUE EXAM BY PATHOLOGIST: CPT | Mod: TC,ELYLAB | Performed by: PHYSICIAN ASSISTANT

## 2024-07-09 PROCEDURE — 88305 TISSUE EXAM BY PATHOLOGIST: CPT | Performed by: STUDENT IN AN ORGANIZED HEALTH CARE EDUCATION/TRAINING PROGRAM

## 2024-07-10 DIAGNOSIS — H61.899 LESION OF EAR CANAL: Primary | ICD-10-CM

## 2024-10-02 ENCOUNTER — TELEPHONE (OUTPATIENT)
Dept: PRIMARY CARE | Facility: CLINIC | Age: 59
End: 2024-10-02
Payer: COMMERCIAL

## 2024-10-02 DIAGNOSIS — Z12.11 ENCOUNTER FOR SCREENING COLONOSCOPY: ICD-10-CM

## 2024-10-02 NOTE — TELEPHONE ENCOUNTER
Patient called requesting to have colonoscopy orders put in. Patient received a notification that she is due to have on completed and stated that her and M discussed about it back in April.

## 2024-10-11 DIAGNOSIS — K21.00 GASTROESOPHAGEAL REFLUX DISEASE WITH ESOPHAGITIS WITHOUT HEMORRHAGE: Primary | ICD-10-CM

## 2024-10-14 RX ORDER — PANTOPRAZOLE SODIUM 40 MG/1
40 TABLET, DELAYED RELEASE ORAL
Qty: 90 TABLET | Refills: 1 | Status: SHIPPED | OUTPATIENT
Start: 2024-10-14 | End: 2025-10-14

## 2024-10-22 ASSESSMENT — PROMIS GLOBAL HEALTH SCALE
RATE_GENERAL_HEALTH: GOOD
CARRYOUT_SOCIAL_ACTIVITIES: EXCELLENT
RATE_AVERAGE_FATIGUE: MILD
RATE_AVERAGE_PAIN: 2
RATE_MENTAL_HEALTH: VERY GOOD
EMOTIONAL_PROBLEMS: NEVER
RATE_SOCIAL_SATISFACTION: VERY GOOD
RATE_PHYSICAL_HEALTH: GOOD
RATE_QUALITY_OF_LIFE: VERY GOOD
CARRYOUT_PHYSICAL_ACTIVITIES: COMPLETELY

## 2024-10-24 ENCOUNTER — APPOINTMENT (OUTPATIENT)
Dept: PRIMARY CARE | Facility: CLINIC | Age: 59
End: 2024-10-24
Payer: COMMERCIAL

## 2024-10-24 VITALS
BODY MASS INDEX: 31.48 KG/M2 | HEIGHT: 63 IN | HEART RATE: 90 BPM | DIASTOLIC BLOOD PRESSURE: 100 MMHG | TEMPERATURE: 97.9 F | WEIGHT: 177.7 LBS | SYSTOLIC BLOOD PRESSURE: 148 MMHG | OXYGEN SATURATION: 100 %

## 2024-10-24 DIAGNOSIS — S32.020D COMPRESSION FRACTURE OF L2 VERTEBRA WITH ROUTINE HEALING, SUBSEQUENT ENCOUNTER: ICD-10-CM

## 2024-10-24 DIAGNOSIS — I10 BENIGN ESSENTIAL HYPERTENSION: ICD-10-CM

## 2024-10-24 DIAGNOSIS — Z00.00 WELLNESS EXAMINATION: Primary | ICD-10-CM

## 2024-10-24 DIAGNOSIS — K21.9 GERD WITHOUT ESOPHAGITIS: ICD-10-CM

## 2024-10-24 PROCEDURE — 3080F DIAST BP >= 90 MM HG: CPT | Performed by: INTERNAL MEDICINE

## 2024-10-24 PROCEDURE — 99396 PREV VISIT EST AGE 40-64: CPT | Performed by: INTERNAL MEDICINE

## 2024-10-24 PROCEDURE — 3077F SYST BP >= 140 MM HG: CPT | Performed by: INTERNAL MEDICINE

## 2024-10-24 PROCEDURE — 1036F TOBACCO NON-USER: CPT | Performed by: INTERNAL MEDICINE

## 2024-10-24 PROCEDURE — 3008F BODY MASS INDEX DOCD: CPT | Performed by: INTERNAL MEDICINE

## 2024-10-24 RX ORDER — FLUCONAZOLE 150 MG/1
150 TABLET ORAL ONCE
COMMUNITY
Start: 2024-03-26 | End: 2024-10-24 | Stop reason: WASHOUT

## 2024-10-24 RX ORDER — EPINEPHRINE 0.3 MG/.3ML
1 INJECTION SUBCUTANEOUS ONCE AS NEEDED
COMMUNITY
Start: 2024-05-11

## 2024-10-24 ASSESSMENT — ENCOUNTER SYMPTOMS
PALPITATIONS: 0
CHEST TIGHTNESS: 0
EYE REDNESS: 0
BLOOD IN STOOL: 0
FEVER: 0
HEMATURIA: 0
STRIDOR: 0
ACTIVITY CHANGE: 0
JOINT SWELLING: 0
SPEECH DIFFICULTY: 0
LIGHT-HEADEDNESS: 0

## 2024-10-24 ASSESSMENT — PATIENT HEALTH QUESTIONNAIRE - PHQ9
2. FEELING DOWN, DEPRESSED OR HOPELESS: NOT AT ALL
1. LITTLE INTEREST OR PLEASURE IN DOING THINGS: NOT AT ALL
SUM OF ALL RESPONSES TO PHQ9 QUESTIONS 1 AND 2: 0

## 2024-10-24 NOTE — PROGRESS NOTES
"CHIEF COMPLAINT:   Annual Wellness Checkup     HISTORY OF PRESENT ILLNESS:   Ivy Ojeda comes for annual medical check up.  Otherwise doing well. No acute medical complaint today. Chronic medical conditions are stable with current medical management. Cognitive function is assessed. Immunizations are age appropriate. Home medications have been reconciled. The healthy lifestyle has been reinforced. Encouraged continued avoidance of tobacco, alcohol, poly pharmacy and substances. Functional capacity has been assessed. Discussed about fall risk and safety measures, at home and outside.  Age appropriate cancer screening tests were recommended. Reminded about code status and health care Power of  (POA). Discussed about mental health and wellbeing after reviewing depression screening questionnaire  (PHQ 9) with the patient for 5 mins. Vital signs, recent lab results, imaging studies were reviewed. Patient wanted to discuss about high blood pressure, gastroesophageal reflux disorder, and compression fracture with back pain.  So a complete physical exams was performed.       Review of Systems   Constitutional:  Negative for activity change and fever.   HENT:  Negative for hearing loss, nosebleeds and tinnitus.    Eyes:  Negative for redness.   Respiratory:  Negative for chest tightness and stridor.    Cardiovascular:  Negative for chest pain, palpitations and leg swelling.   Gastrointestinal:  Negative for blood in stool.   Endocrine: Negative for cold intolerance.   Genitourinary:  Negative for hematuria.   Musculoskeletal:  Negative for joint swelling.   Skin:  Negative for rash.   Neurological:  Negative for speech difficulty and light-headedness.   Psychiatric/Behavioral:  Negative for behavioral problems.        Visit Vitals  BP (!) 148/100 (BP Location: Left arm, Patient Position: Sitting, BP Cuff Size: Adult)   Pulse 90   Temp 36.6 °C (97.9 °F) (Temporal)   Ht 1.6 m (5' 3\")   Wt 80.6 kg (177 lb 11.2 oz) "   SpO2 100% Comment: RA   BMI 31.48 kg/m²   OB Status Hysterectomy   Smoking Status Former   BSA 1.89 m²           Wt Readings from Last 10 Encounters:   10/24/24 80.6 kg (177 lb 11.2 oz)   04/19/24 85.7 kg (189 lb)   12/21/23 83.8 kg (184 lb 11.9 oz)   10/30/23 86.2 kg (190 lb)   07/24/23 86.8 kg (191 lb 4 oz)   05/01/23 84.8 kg (187 lb)   01/26/23 82.1 kg (181 lb)   11/15/22 81.2 kg (179 lb)   04/11/22 79.3 kg (174 lb 14.4 oz)   02/09/22 76.8 kg (169 lb 4.8 oz)        Physical Exam  Constitutional:       General: She is not in acute distress.     Appearance: Normal appearance.   HENT:      Head: Normocephalic.      Right Ear: Tympanic membrane normal.      Left Ear: Tympanic membrane normal.      Mouth/Throat:      Mouth: Mucous membranes are moist.   Cardiovascular:      Rate and Rhythm: Normal rate and regular rhythm.      Heart sounds: No murmur heard.  Pulmonary:      Effort: No respiratory distress.   Abdominal:      Palpations: Abdomen is soft.   Musculoskeletal:      Cervical back: Neck supple.      Right lower leg: No edema.      Left lower leg: No edema.   Skin:     Findings: No rash.   Neurological:      General: No focal deficit present.      Mental Status: She is alert and oriented to person, place, and time.   Psychiatric:         Mood and Affect: Mood normal.        RECENT LABS:  Lab Results   Component Value Date    WBC 4.3 (L) 10/20/2023    HGB 15.0 10/20/2023    HCT 45.5 10/20/2023     10/20/2023    CHOL 164 10/20/2023    TRIG 133 10/20/2023    HDL 46.4 10/20/2023    ALT 17 10/20/2023    AST 18 10/20/2023     10/20/2023    K 3.7 10/20/2023    CL 99 10/20/2023    CREATININE 0.68 10/20/2023    BUN 15 10/20/2023    CO2 33 (H) 10/20/2023    TSH 3.17 04/01/2024    HGBA1C 5.6 11/13/2021     Lab Results   Component Value Date    GLUCOSE 122 (H) 10/20/2023    CALCIUM 9.5 10/20/2023     10/20/2023    K 3.7 10/20/2023    CO2 33 (H) 10/20/2023    CL 99 10/20/2023    BUN 15 10/20/2023     CREATININE 0.68 10/20/2023      Lab Results   Component Value Date    LDLCALC 91 10/20/2023     Lab Results   Component Value Date    HGBA1C 5.6 11/13/2021     Lab Results   Component Value Date    LDLCALC 91 10/20/2023    CREATININE 0.68 10/20/2023     MEDICATIONS:   Current Outpatient Medications   Medication Instructions    aspirin (Adult Low Dose Aspirin) 81 mg EC tablet 1 tablet, Daily    calcium carbonate-vitamin D3 600 mg-5 mcg (200 unit) tablet 1 tablet, Daily    cholecalciferol (VITAMIN D-3) 2,000 Units, Daily    cyanocobalamin (VITAMIN B-12) 250 mcg, Daily    EPINEPHrine 0.3 mg/0.3 mL injection syringe 1 Syringe, Once as needed    fish oil concentrate (Omega-3) 120-180 mg capsule 1 capsule, Daily    fluticasone (Flonase) 50 mcg/actuation nasal spray 2 sprays, Daily    hydroCHLOROthiazide (MICROZIDE) 12.5 mg, oral, Daily    magnesium oxide (MAG-OX) 400 mg    montelukast (SINGULAIR) 10 mg, oral, Daily    pantoprazole (PROTONIX) 40 mg, oral, Daily before breakfast    rosuvastatin (CRESTOR) 5 mg, oral, Nightly    theanine 100 mg capsule 1 capsule, Every other day    vitamin E 400 Units, Daily        TODAY'S VISIT  DX:     1. Wellness examination  Overall health is stable and at base line. Will continue with current medical therapy and plan.  Immunizations, cancer screening tests are age appropriately up to date.      2. Compression fracture of L2 vertebra with routine healing, subsequent encounter  Patient is managing well with vitamin D, calcium and also back exercise.  She had a DEXA scan done in 4/19/2024.      3. Benign essential hypertension  Blood pressure in the office is high today.  Her home readings are within the normal limit.  She had high sodium content diet this afternoon.  Patient will recheck her blood pressure at home.      4. GERD without esophagitis  Will continue with pantoprazole.  She should not be taking both Nexium and pantoprazole.         MEDICAL DECISION MAKING:   - Relevant  correspondence/notes from specialty consultants were reviewed.  - Active co morbidities conditions are stable for now.    - Cognitive function stable.    - Immunizations are age appropriately up to date.   - Preventative cancer screening tests are up-to-date.    -Patient did not get the annual blood work this year which has been ordered in April 2024.  - F/U in April 2025      PS: This note was completed using Dragon voice recognition technology and may include unintended errors with respect to translation of words, typographical errors or grammar errors which may not have been identified while finalizing the chart.

## 2024-10-26 ENCOUNTER — LAB (OUTPATIENT)
Dept: LAB | Facility: LAB | Age: 59
End: 2024-10-26
Payer: COMMERCIAL

## 2024-10-26 DIAGNOSIS — I10 ESSENTIAL (PRIMARY) HYPERTENSION: ICD-10-CM

## 2024-10-26 DIAGNOSIS — R94.6 BORDERLINE ABNORMAL TFTS: ICD-10-CM

## 2024-10-26 DIAGNOSIS — Z00.00 ENCOUNTER FOR WELLNESS EXAMINATION: ICD-10-CM

## 2024-10-26 DIAGNOSIS — R39.9 URINARY TRACT INFECTION SYMPTOMS: ICD-10-CM

## 2024-10-26 DIAGNOSIS — E55.9 VITAMIN D INSUFFICIENCY: ICD-10-CM

## 2024-10-26 DIAGNOSIS — E78.2 MIXED DYSLIPIDEMIA: ICD-10-CM

## 2024-10-26 LAB
25(OH)D3 SERPL-MCNC: 97 NG/ML (ref 30–100)
ALBUMIN SERPL BCP-MCNC: 4.7 G/DL (ref 3.4–5)
ALP SERPL-CCNC: 73 U/L (ref 33–136)
ALT SERPL W P-5'-P-CCNC: 24 U/L (ref 7–45)
ANION GAP SERPL CALC-SCNC: 13 MMOL/L (ref 10–20)
APPEARANCE UR: CLEAR
AST SERPL W P-5'-P-CCNC: 22 U/L (ref 9–39)
BASOPHILS # BLD AUTO: 0.03 X10*3/UL (ref 0–0.1)
BASOPHILS NFR BLD AUTO: 0.8 %
BILIRUB SERPL-MCNC: 0.8 MG/DL (ref 0–1.2)
BILIRUB UR STRIP.AUTO-MCNC: NEGATIVE MG/DL
BUN SERPL-MCNC: 17 MG/DL (ref 6–23)
CALCIUM SERPL-MCNC: 9.8 MG/DL (ref 8.6–10.3)
CHLORIDE SERPL-SCNC: 95 MMOL/L (ref 98–107)
CHOLEST SERPL-MCNC: 171 MG/DL (ref 0–199)
CHOLESTEROL/HDL RATIO: 3.7
CO2 SERPL-SCNC: 33 MMOL/L (ref 21–32)
COLOR UR: YELLOW
CREAT SERPL-MCNC: 0.74 MG/DL (ref 0.5–1.05)
EGFRCR SERPLBLD CKD-EPI 2021: >90 ML/MIN/1.73M*2
EOSINOPHIL # BLD AUTO: 0.11 X10*3/UL (ref 0–0.7)
EOSINOPHIL NFR BLD AUTO: 2.8 %
ERYTHROCYTE [DISTWIDTH] IN BLOOD BY AUTOMATED COUNT: 12.9 % (ref 11.5–14.5)
GLUCOSE SERPL-MCNC: 114 MG/DL (ref 74–99)
GLUCOSE UR STRIP.AUTO-MCNC: NORMAL MG/DL
HCT VFR BLD AUTO: 45.6 % (ref 36–46)
HDLC SERPL-MCNC: 46.8 MG/DL
HGB BLD-MCNC: 15 G/DL (ref 12–16)
IMM GRANULOCYTES # BLD AUTO: 0.03 X10*3/UL (ref 0–0.7)
IMM GRANULOCYTES NFR BLD AUTO: 0.8 % (ref 0–0.9)
KETONES UR STRIP.AUTO-MCNC: NEGATIVE MG/DL
LDLC SERPL CALC-MCNC: 104 MG/DL
LEUKOCYTE ESTERASE UR QL STRIP.AUTO: NEGATIVE
LYMPHOCYTES # BLD AUTO: 1.5 X10*3/UL (ref 1.2–4.8)
LYMPHOCYTES NFR BLD AUTO: 38.3 %
MCH RBC QN AUTO: 30.5 PG (ref 26–34)
MCHC RBC AUTO-ENTMCNC: 32.9 G/DL (ref 32–36)
MCV RBC AUTO: 93 FL (ref 80–100)
MONOCYTES # BLD AUTO: 0.53 X10*3/UL (ref 0.1–1)
MONOCYTES NFR BLD AUTO: 13.5 %
NEUTROPHILS # BLD AUTO: 1.72 X10*3/UL (ref 1.2–7.7)
NEUTROPHILS NFR BLD AUTO: 43.8 %
NITRITE UR QL STRIP.AUTO: NEGATIVE
NON HDL CHOLESTEROL: 124 MG/DL (ref 0–149)
NRBC BLD-RTO: 0 /100 WBCS (ref 0–0)
PH UR STRIP.AUTO: 6 [PH]
PLATELET # BLD AUTO: 226 X10*3/UL (ref 150–450)
POTASSIUM SERPL-SCNC: 3.8 MMOL/L (ref 3.5–5.3)
PROT SERPL-MCNC: 7.4 G/DL (ref 6.4–8.2)
PROT UR STRIP.AUTO-MCNC: NEGATIVE MG/DL
RBC # BLD AUTO: 4.92 X10*6/UL (ref 4–5.2)
RBC # UR STRIP.AUTO: NEGATIVE /UL
SODIUM SERPL-SCNC: 137 MMOL/L (ref 136–145)
SP GR UR STRIP.AUTO: 1.02
T3FREE SERPL-MCNC: 3.6 PG/ML (ref 2.3–4.2)
T4 FREE SERPL-MCNC: 0.92 NG/DL (ref 0.61–1.12)
TRIGL SERPL-MCNC: 101 MG/DL (ref 0–149)
TSH SERPL-ACNC: 4.28 MIU/L (ref 0.44–3.98)
UROBILINOGEN UR STRIP.AUTO-MCNC: NORMAL MG/DL
VLDL: 20 MG/DL (ref 0–40)
WBC # BLD AUTO: 3.9 X10*3/UL (ref 4.4–11.3)

## 2024-10-26 PROCEDURE — 36415 COLL VENOUS BLD VENIPUNCTURE: CPT

## 2024-10-26 PROCEDURE — 85025 COMPLETE CBC W/AUTO DIFF WBC: CPT

## 2024-10-26 PROCEDURE — 80053 COMPREHEN METABOLIC PANEL: CPT

## 2024-10-26 PROCEDURE — 81003 URINALYSIS AUTO W/O SCOPE: CPT

## 2024-10-26 PROCEDURE — 80061 LIPID PANEL: CPT

## 2024-10-26 PROCEDURE — 84439 ASSAY OF FREE THYROXINE: CPT

## 2024-10-26 PROCEDURE — 84481 FREE ASSAY (FT-3): CPT

## 2024-10-26 PROCEDURE — 82306 VITAMIN D 25 HYDROXY: CPT

## 2024-10-26 PROCEDURE — 84443 ASSAY THYROID STIM HORMONE: CPT

## 2024-11-11 DIAGNOSIS — J30.9 ALLERGIC RHINITIS, UNSPECIFIED SEASONALITY, UNSPECIFIED TRIGGER: ICD-10-CM

## 2024-11-11 DIAGNOSIS — E78.2 MIXED HYPERLIPIDEMIA: ICD-10-CM

## 2024-11-11 RX ORDER — ROSUVASTATIN CALCIUM 5 MG/1
5 TABLET, COATED ORAL NIGHTLY
Qty: 90 TABLET | Refills: 1 | Status: SHIPPED | OUTPATIENT
Start: 2024-11-11

## 2024-11-11 RX ORDER — MONTELUKAST SODIUM 10 MG/1
10 TABLET ORAL DAILY
Qty: 90 TABLET | Refills: 1 | Status: SHIPPED | OUTPATIENT
Start: 2024-11-11

## 2024-12-05 ENCOUNTER — HOSPITAL ENCOUNTER (OUTPATIENT)
Dept: RADIOLOGY | Facility: HOSPITAL | Age: 59
Discharge: HOME | End: 2024-12-05
Payer: COMMERCIAL

## 2024-12-05 VITALS — BODY MASS INDEX: 30.65 KG/M2 | HEIGHT: 63 IN | WEIGHT: 173 LBS

## 2024-12-05 DIAGNOSIS — Z12.31 SCREENING MAMMOGRAM FOR BREAST CANCER: ICD-10-CM

## 2024-12-05 PROCEDURE — 77067 SCR MAMMO BI INCL CAD: CPT

## 2024-12-05 PROCEDURE — 77063 BREAST TOMOSYNTHESIS BI: CPT | Performed by: RADIOLOGY

## 2024-12-05 PROCEDURE — 77067 SCR MAMMO BI INCL CAD: CPT | Performed by: RADIOLOGY

## 2024-12-06 DIAGNOSIS — I10 BENIGN ESSENTIAL HYPERTENSION: ICD-10-CM

## 2024-12-06 RX ORDER — HYDROCHLOROTHIAZIDE 12.5 MG/1
12.5 CAPSULE ORAL DAILY
Qty: 90 CAPSULE | Refills: 1 | Status: SHIPPED | OUTPATIENT
Start: 2024-12-06

## 2025-02-05 ENCOUNTER — OFFICE VISIT (OUTPATIENT)
Dept: URGENT CARE | Age: 60
End: 2025-02-05
Payer: COMMERCIAL

## 2025-02-05 ENCOUNTER — ANCILLARY PROCEDURE (OUTPATIENT)
Dept: URGENT CARE | Age: 60
End: 2025-02-05
Payer: COMMERCIAL

## 2025-02-05 VITALS
OXYGEN SATURATION: 98 % | BODY MASS INDEX: 30.65 KG/M2 | WEIGHT: 173 LBS | SYSTOLIC BLOOD PRESSURE: 139 MMHG | RESPIRATION RATE: 18 BRPM | DIASTOLIC BLOOD PRESSURE: 82 MMHG | HEART RATE: 91 BPM | TEMPERATURE: 97.4 F

## 2025-02-05 DIAGNOSIS — R05.1 ACUTE COUGH: ICD-10-CM

## 2025-02-05 DIAGNOSIS — J18.9 PNEUMONIA OF LEFT LOWER LOBE DUE TO INFECTIOUS ORGANISM: Primary | ICD-10-CM

## 2025-02-05 PROCEDURE — 71046 X-RAY EXAM CHEST 2 VIEWS: CPT | Performed by: NURSE PRACTITIONER

## 2025-02-05 RX ORDER — METHYLPREDNISOLONE 4 MG/1
TABLET ORAL
Qty: 21 TABLET | Refills: 0 | Status: SHIPPED | OUTPATIENT
Start: 2025-02-05

## 2025-02-05 RX ORDER — ALBUTEROL SULFATE 90 UG/1
2 INHALANT RESPIRATORY (INHALATION) EVERY 6 HOURS PRN
Qty: 18 G | Refills: 0 | Status: SHIPPED | OUTPATIENT
Start: 2025-02-05 | End: 2026-02-05

## 2025-02-05 RX ORDER — AZITHROMYCIN 250 MG/1
TABLET, FILM COATED ORAL
Qty: 6 TABLET | Refills: 0 | Status: SHIPPED | OUTPATIENT
Start: 2025-02-05

## 2025-02-05 RX ORDER — BENZONATATE 200 MG/1
200 CAPSULE ORAL 3 TIMES DAILY PRN
Qty: 21 CAPSULE | Refills: 0 | Status: SHIPPED | OUTPATIENT
Start: 2025-02-05 | End: 2025-02-12

## 2025-02-05 RX ORDER — BROMPHENIRAMINE MALEATE, PSEUDOEPHEDRINE HYDROCHLORIDE, AND DEXTROMETHORPHAN HYDROBROMIDE 2; 30; 10 MG/5ML; MG/5ML; MG/5ML
5 SYRUP ORAL EVERY 4 HOURS PRN
Qty: 120 ML | Refills: 0 | Status: SHIPPED | OUTPATIENT
Start: 2025-02-05

## 2025-02-05 ASSESSMENT — ENCOUNTER SYMPTOMS
WHEEZING: 1
SORE THROAT: 0
COUGH: 1
FEVER: 0
SHORTNESS OF BREATH: 1
CHILLS: 0

## 2025-02-05 NOTE — PROGRESS NOTES
Subjective   Patient ID: Ivy Ojeda is a 59 y.o. female. They present today with a chief complaint of Cough (-x10 days/-Was productive, now dry x4 days).    History of Present Illness    Patient presents for possible pneumonia. She has had a cough for around 10 days now. It was productive and it is now slightly dry. Still able to bring some stuff up occasionally. She is mildly short of breath and has had some wheezing. Also has some congestion and PND. No fever/chills. She has tried OTC with minimal relief.    Cough  Associated symptoms include postnasal drip, shortness of breath and wheezing. Pertinent negatives include no chills, ear pain, fever or sore throat.     Past Medical History  Allergies as of 02/05/2025 - Reviewed 02/05/2025   Allergen Reaction Noted    Adhesive tape-silicones Unknown 12/19/2014    Statins-hmg-coa reductase inhibitors Unknown 10/26/2023       (Not in a hospital admission)       Past Medical History:   Diagnosis Date    Acute nasopharyngitis (common cold)     Nasopharyngitis    Candidiasis, unspecified     Antibiotic-induced yeast infection    Diverticulosis of intestine, part unspecified, without perforation or abscess without bleeding     Diverticulosis    Encounter for general adult medical examination without abnormal findings 11/29/2022    Annual physical exam    Encounter for screening for malignant neoplasm of colon     Screening for malignant neoplasm of colon    Erythema intertrigo     Intertrigo    Frequent UTI     GERD (gastroesophageal reflux disease)     High cholesterol     Hypertension     Other forms of angina pectoris     Anginal equivalent    Other forms of dyspnea     Dyspnea on minimal exertion    Personal history of diseases of the skin and subcutaneous tissue     History of rosacea    Personal history of other diseases of the nervous system and sense organs     History of otitis media    Personal history of other diseases of the respiratory system     History of  acute bronchitis    Personal history of other diseases of the respiratory system     History of acute sinusitis    Personal history of other specified conditions     History of vertigo    Personal history of other specified conditions     History of palpitations    Personal history of other specified conditions     History of breast lump    Personal history of other specified conditions     History of dysuria    Personal history of other specified conditions     History of weight gain    Personal history of urinary (tract) infections     History of urinary tract infection    PONV (postoperative nausea and vomiting)     Pruritus vulvae 2020    Vulvar itching    Shortness of breath     SOB (shortness of breath) on exertion    Unspecified nonsuppurative otitis media, unspecified ear     Middle ear effusion       Past Surgical History:   Procedure Laterality Date    CERVICAL SPINE SURGERY  10/31/2021    Neck surgery     SECTION, CLASSIC  2020     section    CHOLECYSTECTOMY  10/31/2021    Cholecystectomy    COLONOSCOPY  10/31/2021    Complete colonoscopy    CYST REMOVAL      FOOT SURGERY Left 2020    Foot surgery    HYSTERECTOMY  2021    Hysterectomy total    TONSILLECTOMY  2020    Tonsillectomy        reports that she quit smoking about 18 years ago. Her smoking use included cigarettes. She started smoking about 48 years ago. She has been exposed to tobacco smoke. She has never used smokeless tobacco. She reports that she does not drink alcohol and does not use drugs.    Review of Systems  Review of Systems   Constitutional:  Negative for chills and fever.   HENT:  Positive for congestion and postnasal drip. Negative for ear pain and sore throat.    Respiratory:  Positive for cough, shortness of breath and wheezing.               Objective    Vitals:    25 0831   BP: 139/82   BP Location: Left arm   Patient Position: Sitting   BP Cuff Size: Large adult   Pulse: 91    Resp: 18   Temp: 36.3 °C (97.4 °F)   TempSrc: Oral   SpO2: 98%   Weight: 78.5 kg (173 lb)     No LMP recorded. Patient has had a hysterectomy.    Physical Exam  Constitutional:       General: She is not in acute distress.     Appearance: Normal appearance.   HENT:      Head: Normocephalic and atraumatic.      Right Ear: No middle ear effusion. Tympanic membrane is not erythematous or bulging.      Left Ear:  No middle ear effusion. Tympanic membrane is not erythematous or bulging.      Nose:      Right Sinus: No maxillary sinus tenderness or frontal sinus tenderness.      Left Sinus: No maxillary sinus tenderness or frontal sinus tenderness.      Mouth/Throat:      Pharynx: Posterior oropharyngeal erythema and postnasal drip present. No pharyngeal swelling or oropharyngeal exudate.   Eyes:      General: Lids are normal.   Cardiovascular:      Rate and Rhythm: Normal rate and regular rhythm.      Heart sounds: Normal heart sounds.   Pulmonary:      Effort: Pulmonary effort is normal. No respiratory distress.      Breath sounds: Rhonchi present.   Skin:     General: Skin is warm and dry.   Neurological:      General: No focal deficit present.      Mental Status: She is alert and oriented to person, place, and time.   Psychiatric:         Attention and Perception: Attention normal.         Mood and Affect: Mood normal.         Speech: Speech normal.         Behavior: Behavior normal.         Thought Content: Thought content normal.         Procedures    Point of Care Test & Imaging Results from this visit  No results found for this visit on 02/05/25.   XR chest 2 views    Result Date: 2/5/2025  Interpreted By:  Rashaun Dukes, STUDY: XR CHEST 2 VIEWS;  2/5/2025 8:58 am   INDICATION: Signs/Symptoms:cough, shortness of breath.   COMPARISON: 12/22/2011   ACCESSION NUMBER(S): [id number]   ORDERING CLINICIAN: RUBINA HARTMAN   FINDINGS: CHEST/LUNGS: The cardiac and mediastinal silhouettes are within normal limits for the  [FreeTextEntry1] : MIGUEL ANGEL [place]  is a 57 year old  F   History of PAF s/p RFA 2016, HTN, obesity, Crohn's, low Mg, edema, anemia (hgb range 9-11) There is + FHx of AAA - father  following repair of aortic aneurysm   There is no prior history of a clinical myocardial infarction, coronary revascularization.  There is no history of symptomatic congestive heart failure rheumatic heart disease or valvular disease. There has been no recent illness or hospital stay.   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 Using treadmill at home to stay active.  Palpitations improved on increase dose of Metoprolol. No sustained symptomatic recurrence. Checks on monitor at work sometimes and notes APCs.   There is no exertional chest pain, pressure or discomfort.  There is no significant dyspnea on exertion or orthopnea.  There is no lightheadedness, dizziness or syncope. No bleeding issues. No bright red blood per rectum or peptic ulcer disease.  Crohn's flare summer 2022. She had symptomatic palpitations. She required immunosuppression fluids and electrolytes. There was no recurrent atrial fibrillation. No bleeding issues on AC.  She tried Saxenda, working with PCP for weight loss. Had significant GI side effects and stopped   EKG 23 shows normal sinus rhythm unchanged.  2023 hg 10.9, k 3.7, cr 0.6, a1c 5.6, LDL 62, tsh wnl  Labs 2021 CRP/BNP/CK/TSH/Lp(a) wnl Echocardiogram 2023  demonstrates normal left ventricular function, minimal valvular heart disease, Mild AS.  Abd 2023 no AAA Carotid 2023 no stenosis  Zio patch  with rare ectopy sequential APCs   technique. No focal areas of consolidation are noted. No effusion or pneumothorax is seen.   There is an opacity at the left base, adjacent to the left heart border, which may relate to atelectasis or infiltrate. This was not present on the previous study.   UPPER ABDOMEN: No remarkable upper abdominal findings.   OSSEOUS STRUCTURES: No acute changes.       Mild area of atelectasis or infiltrate at the left base. After treatment, follow-up recommended to document resolution/stability.   MACRO: None.   Signed by: Rashaun Dukes 2/5/2025 9:17 AM Dictation workstation:   [id number]     Diagnostic study results (if any) were reviewed by KAELYN Greene.    Assessment/Plan   Allergies, medications, history, and pertinent labs/EKGs/Imaging reviewed by KAELYN Greene.     Medical Decision Making  XR showed possible pneumonia.   At time of discharge patient was clinically well-appearing and HDS for outpatient management. She was educated regarding diagnosis, supportive care, OTC and Rx medications. She was given the opportunity to ask questions prior to discharge.  They verbalized understanding of my discussion of the plans for treatment, expected course, indications to return to  or seek further evaluation in ED, and the need for timely follow up as directed.       Orders and Diagnoses  Diagnoses and all orders for this visit:  Pneumonia of left lower lobe due to infectious organism  -     XR chest 2 views; Future  -     azithromycin (Zithromax) 250 mg tablet; Take 2 tabs (500 mg) by mouth today, then take 1 tab daily for 4 days.  -     benzonatate (Tessalon) 200 mg capsule; Take 1 capsule (200 mg) by mouth 3 times a day as needed for cough for up to 7 days. Do not crush or chew.  -     brompheniramine-pseudoeph-DM (Bromfed DM) 2-30-10 mg/5 mL syrup; Take 5 mL by mouth every 4 hours if needed for allergies, congestion or cough.  -     albuterol 90 mcg/actuation inhaler; Inhale 2 puffs every 6 hours if  needed for wheezing.  -     methylPREDNISolone (Medrol Dospak) 4 mg tablets; Take as directed on package.      Medical Admin Record      Patient disposition: Home    Electronically signed by KAELYN Greene  9:34 AM

## 2025-03-21 DIAGNOSIS — K21.00 GASTROESOPHAGEAL REFLUX DISEASE WITH ESOPHAGITIS WITHOUT HEMORRHAGE: ICD-10-CM

## 2025-03-21 RX ORDER — PANTOPRAZOLE SODIUM 40 MG/1
40 TABLET, DELAYED RELEASE ORAL
Qty: 90 TABLET | Refills: 3 | Status: SHIPPED | OUTPATIENT
Start: 2025-03-21 | End: 2026-03-21

## 2025-03-25 DIAGNOSIS — K21.00 GASTROESOPHAGEAL REFLUX DISEASE WITH ESOPHAGITIS WITHOUT HEMORRHAGE: ICD-10-CM

## 2025-03-25 RX ORDER — PANTOPRAZOLE SODIUM 40 MG/1
40 TABLET, DELAYED RELEASE ORAL
Qty: 90 TABLET | Refills: 3 | Status: SHIPPED | OUTPATIENT
Start: 2025-03-25 | End: 2025-03-27 | Stop reason: SDUPTHER

## 2025-03-27 ENCOUNTER — OFFICE VISIT (OUTPATIENT)
Dept: PRIMARY CARE | Facility: CLINIC | Age: 60
End: 2025-03-27
Payer: COMMERCIAL

## 2025-03-27 VITALS
SYSTOLIC BLOOD PRESSURE: 136 MMHG | BODY MASS INDEX: 31.4 KG/M2 | TEMPERATURE: 97.9 F | HEART RATE: 83 BPM | WEIGHT: 177.2 LBS | OXYGEN SATURATION: 97 % | DIASTOLIC BLOOD PRESSURE: 86 MMHG

## 2025-03-27 DIAGNOSIS — H61.22 IMPACTED CERUMEN OF LEFT EAR: ICD-10-CM

## 2025-03-27 DIAGNOSIS — S32.020D COMPRESSION FRACTURE OF L2 VERTEBRA WITH ROUTINE HEALING, SUBSEQUENT ENCOUNTER: ICD-10-CM

## 2025-03-27 DIAGNOSIS — K21.00 GASTROESOPHAGEAL REFLUX DISEASE WITH ESOPHAGITIS WITHOUT HEMORRHAGE: ICD-10-CM

## 2025-03-27 DIAGNOSIS — R09.1 PLEURISY: Primary | ICD-10-CM

## 2025-03-27 DIAGNOSIS — M06.9 RHEUMATOID ARTHRITIS OF BOTH HIPS, UNSPECIFIED WHETHER RHEUMATOID FACTOR PRESENT (MULTI): ICD-10-CM

## 2025-03-27 PROCEDURE — 99214 OFFICE O/P EST MOD 30 MIN: CPT | Performed by: INTERNAL MEDICINE

## 2025-03-27 PROCEDURE — 69210 REMOVE IMPACTED EAR WAX UNI: CPT | Performed by: INTERNAL MEDICINE

## 2025-03-27 PROCEDURE — 3075F SYST BP GE 130 - 139MM HG: CPT | Performed by: INTERNAL MEDICINE

## 2025-03-27 PROCEDURE — 3079F DIAST BP 80-89 MM HG: CPT | Performed by: INTERNAL MEDICINE

## 2025-03-27 PROCEDURE — 1036F TOBACCO NON-USER: CPT | Performed by: INTERNAL MEDICINE

## 2025-03-27 RX ORDER — FLUTICASONE PROPIONATE AND SALMETEROL 500; 50 UG/1; UG/1
1 POWDER RESPIRATORY (INHALATION)
Qty: 60 EACH | Refills: 11 | Status: SHIPPED | OUTPATIENT
Start: 2025-03-27 | End: 2026-03-27

## 2025-03-27 RX ORDER — PANTOPRAZOLE SODIUM 40 MG/1
40 TABLET, DELAYED RELEASE ORAL
Qty: 90 TABLET | Refills: 3 | Status: SHIPPED | OUTPATIENT
Start: 2025-03-27 | End: 2026-03-27

## 2025-03-27 ASSESSMENT — PATIENT HEALTH QUESTIONNAIRE - PHQ9
SUM OF ALL RESPONSES TO PHQ9 QUESTIONS 1 AND 2: 0
2. FEELING DOWN, DEPRESSED OR HOPELESS: NOT AT ALL
1. LITTLE INTEREST OR PLEASURE IN DOING THINGS: NOT AT ALL

## 2025-03-28 NOTE — PROGRESS NOTES
Subjective     Patient ID: Ivy Ojeda is a 59 y.o. female who presents for Follow-up (Patient states that she is here today for a follow up from when they had pneumonia ) and Lab Orders (Need new orders prior to this visit for retest of glucose and thyroid. Patient states  wanted these to be retested in 3-4 months).  History of Present Illness  Ivy Ojeda is a 59 year old female who presents with persistent respiratory symptoms and hearing issues.    Six weeks ago, she was diagnosed with pneumonia in the left lung and completed a course of antibiotics and steroids. Despite treatment, she continues to experience discomfort in the left lung, particularly when taking deep breaths, describing it as 'uncomfortable' and 'like a stop,' which makes it difficult to catch her breath. She has no recent fever or chills, and her cough resolved with the initial treatment. However, she still feels her breathing is not clear, especially during exertion, such as climbing stairs or working outside.    For the past two and a half months, she has experienced a sensation of being 'in a barrel' and hearing difficulties. Her hearing is impaired, with occasional sharp pains in her ears. She took Mucinex with minimal relief and reports no significant nasal congestion. Her left ear feels blocked, and she experiences better hearing when manipulating her ear.    She has a history of rheumatoid arthritis but is unsure if it is related to her current symptoms. She was previously prescribed an inhaler but chose not to use it. Her current medications include pantoprazole, which she receives from [facility], and she has been monitoring her blood pressure at home, which she reports as stable.    Objective   Vitals:    03/27/25 1027 03/27/25 1029   BP: (!) 140/114 136/86   BP Location: Left arm Right arm   Patient Position: Sitting Sitting   BP Cuff Size: Adult Adult   Pulse: 83    Temp: 36.6 °C (97.9 °F)    TempSrc: Temporal     SpO2: 97%    Weight: 80.4 kg (177 lb 3.2 oz)      Wt Readings from Last 10 Encounters:   03/27/25 80.4 kg (177 lb 3.2 oz)   02/05/25 78.5 kg (173 lb)   12/05/24 78.5 kg (173 lb)   10/24/24 80.6 kg (177 lb 11.2 oz)   04/19/24 85.7 kg (189 lb)   12/21/23 83.8 kg (184 lb 11.9 oz)   10/30/23 86.2 kg (190 lb)   07/24/23 86.8 kg (191 lb 4 oz)   05/01/23 84.8 kg (187 lb)   01/26/23 82.1 kg (181 lb)       Medication  Current Outpatient Medications   Medication Instructions    albuterol 90 mcg/actuation inhaler 2 puffs, inhalation, Every 6 hours PRN    aspirin (Adult Low Dose Aspirin) 81 mg EC tablet 1 tablet, Daily    calcium carbonate-vitamin D3 600 mg-5 mcg (200 unit) tablet 1 tablet, Daily    cholecalciferol (VITAMIN D-3) 2,000 Units, Daily    cyanocobalamin (VITAMIN B-12) 250 mcg, Daily    EPINEPHrine 0.3 mg/0.3 mL injection syringe 1 Syringe, Once as needed    fish oil concentrate (Omega-3) 120-180 mg capsule 1 capsule, Daily    fluticasone (Flonase) 50 mcg/actuation nasal spray 2 sprays, Daily    fluticasone propion-salmeteroL (Advair Diskus) 500-50 mcg/dose diskus inhaler 1 puff, inhalation, 2 times daily RT, Rinse mouth with water after use to reduce aftertaste and incidence of candidiasis. Do not swallow.    hydroCHLOROthiazide (MICROZIDE) 12.5 mg, oral, Daily    magnesium oxide (MAG-OX) 400 mg    montelukast (SINGULAIR) 10 mg, oral, Daily    pantoprazole (PROTONIX) 40 mg, oral, Daily before breakfast    rosuvastatin (CRESTOR) 5 mg, oral, Nightly    theanine 100 mg capsule 1 capsule, Every other day    vitamin E 400 Units, Daily     Physical Exam  Gen: Alert, awake, Oriented X 3. Not in any acute distress   HEENT:  Atraumatic, PERRL.  Conjunctivae clear.   Moist nasal mucous membranes. oropharynx non erythematous,   Neck:  Supple without thyromegaly or lymphadenopathy.  Lungs:  Clear to auscultation without rales, rhonchi, + rub left side  Heart:  RRR, S1, S2, without M.  Abdomen:  Soft, non tender, no organ  enlargement, bruit. Bowel sounds present . No CVA tenderness.  Extremities:  No edema. No calf swelling or tenderness.    Skin:  No rash, ecchymosis or erythema.      Review of Systems   Constitutional:  No activity change or fever   HENT:  Denies ringing ears or nose bleed   Respiratory:  Denies stridor. No blood in sputum   Cardiovascular:  Denies chest pain, no sudden excessive sweating   Gastrointestinal:  No sour burping, no blood in stool    Genitourinary:  Denies blood in urine    Musculoskeletal:  No joint redness or swelling    Skin:  No new spot changing color or shape or border    Neurological:  No speech difficulty, facial droop    Psychiatric/Behavioral:  No agitation, denies Hallucination     Assessment/Plan     1. Pleurisy  fluticasone propion-salmeteroL (Advair Diskus) 500-50 mcg/dose diskus inhaler      2. Gastroesophageal reflux disease with esophagitis without hemorrhage  pantoprazole (ProtoNix) 40 mg EC tablet      3. Impacted cerumen of left ear  Ear irrigation was done using curette      4. Compression fracture of L2 vertebra with routine healing, subsequent encounter  Stable at this time.  She may have acute on chronic back pain      5. Rheumatoid arthritis of both hips, unspecified whether rheumatoid factor present (Multi)  Stable no acute flareup.        PROCEDURE:  The impacted ear wax cleaning procedure through irrigation with hydrogen peroxide and wax curette was explained to the patient.  Risk and alternatives were also explained.  Patient verbally consented to this procedure.  First utilizing a wax curette, soft wax was removed from left ear.  Thereafter, about 30 mL of lukewarm water mixed with hydrogen peroxide was instilled in left ear with a high pressure ear wax irrigation syringe.  Hefty amount of wax was seen coming out in the kidney tray.  The bilateral tympanic membranes, thereafter were visualized with intact cone of lights.  The patient tolerated the procedure  well.      Assessment & Plan  Pleuritis  Persistent pleuritis post-pneumonia with pleural rub and sticky lung secretions.  - Prescribed Advair inhaler (fluticasone/salmeterol) twice daily for 5-6 days.  - Instructed to perform incentive spirometry using a straw 10 times per hour.    Impacted cerumen in left ear  Impacted cerumen causing hearing difficulty in left ear.  - Performed ear irrigation with lukewarm water and hydrogen peroxide.  - Consider ENT referral if irrigation unsuccessful.    Benign essential hypertension  Elevated blood pressure today, likely due to Mucinex, but generally well-controlled.    Follow-up  Requires follow-up for current issues addressed.  - Sent Advair prescription to [facility] [address].  - Sent pantoprazole prescription to [facility] for future refills.          VISIT SUMMARY:  Today, we addressed your ongoing respiratory symptoms and hearing issues. You have been experiencing discomfort in your left lung and hearing difficulties, particularly in your left ear. We discussed your treatment options and made some adjustments to help improve your symptoms.    YOUR PLAN:  -PLEURITIS: Pleuritis is inflammation of the tissues that line your lungs and chest cavity. It can cause sharp chest pain that worsens with breathing. We have prescribed an Advair inhaler to use twice daily for 5-6 days and advised you to perform incentive spirometry using a straw 10 times per hour to help clear your lungs.    -IMPACTED CERUMEN IN LEFT EAR: Impacted cerumen means there is a buildup of earwax in your ear canal, which can cause hearing difficulties. We performed ear irrigation with lukewarm water and hydrogen peroxide to try to clear the blockage. If this does not help, we may refer you to an ENT specialist.    -BENIGN ESSENTIAL HYPERTENSION: Benign essential hypertension is high blood pressure that is generally well-controlled. Your blood pressure was elevated today, likely due to taking  Mucinex, but it is usually stable.    INSTRUCTIONS:  Please follow up with us to monitor your progress with the Advair inhaler and ear irrigation. We have sent your Advair prescription to [facility] [address] and your pantoprazole prescription to [facility] for future refills.        This medical note was created with the assistance of artificial intelligence (AI) for documentation purposes. The content has been reviewed and confirmed by the healthcare provider for accuracy and completeness. Patient consented to the use of audio recording and use of AI during their visit.

## 2025-04-01 ENCOUNTER — APPOINTMENT (OUTPATIENT)
Dept: PRIMARY CARE | Facility: CLINIC | Age: 60
End: 2025-04-01
Payer: COMMERCIAL

## 2025-04-22 DIAGNOSIS — J30.9 ALLERGIC RHINITIS, UNSPECIFIED SEASONALITY, UNSPECIFIED TRIGGER: ICD-10-CM

## 2025-04-22 DIAGNOSIS — E78.2 MIXED HYPERLIPIDEMIA: ICD-10-CM

## 2025-04-23 RX ORDER — ROSUVASTATIN CALCIUM 5 MG/1
5 TABLET, COATED ORAL NIGHTLY
Qty: 90 TABLET | Refills: 3 | Status: SHIPPED | OUTPATIENT
Start: 2025-04-23 | End: 2026-04-23

## 2025-04-23 RX ORDER — MONTELUKAST SODIUM 10 MG/1
10 TABLET ORAL DAILY
Qty: 90 TABLET | Refills: 3 | Status: SHIPPED | OUTPATIENT
Start: 2025-04-23 | End: 2026-04-23

## 2025-04-25 DIAGNOSIS — J30.9 ALLERGIC RHINITIS, UNSPECIFIED SEASONALITY, UNSPECIFIED TRIGGER: ICD-10-CM

## 2025-04-25 DIAGNOSIS — E78.2 MIXED HYPERLIPIDEMIA: ICD-10-CM

## 2025-05-07 RX ORDER — ROSUVASTATIN CALCIUM 5 MG/1
5 TABLET, COATED ORAL DAILY
Qty: 90 TABLET | Refills: 3 | Status: SHIPPED | OUTPATIENT
Start: 2025-05-07

## 2025-05-07 RX ORDER — MONTELUKAST SODIUM 10 MG/1
10 TABLET ORAL NIGHTLY
Qty: 90 TABLET | Refills: 3 | Status: SHIPPED | OUTPATIENT
Start: 2025-05-07

## 2025-05-07 NOTE — TELEPHONE ENCOUNTER
Last OV: 3/27/2025   Future Appointments       Date / Time Provider Department Dept Phone    10/30/2025 4:30 PM Matty Philippe MD [facility] [phone]

## 2025-06-06 DIAGNOSIS — I10 BENIGN ESSENTIAL HYPERTENSION: ICD-10-CM

## 2025-06-07 RX ORDER — HYDROCHLOROTHIAZIDE 12.5 MG/1
12.5 CAPSULE ORAL DAILY
Qty: 90 CAPSULE | Refills: 3 | Status: SHIPPED | OUTPATIENT
Start: 2025-06-07

## 2025-06-09 DIAGNOSIS — R42 DIZZINESS AND GIDDINESS: Primary | ICD-10-CM

## 2025-06-09 RX ORDER — MECLIZINE HYDROCHLORIDE 25 MG/1
25 TABLET ORAL 3 TIMES DAILY PRN
Qty: 30 TABLET | Refills: 0 | Status: SHIPPED | OUTPATIENT
Start: 2025-06-09 | End: 2025-06-19

## 2025-06-30 ENCOUNTER — TELEPHONE (OUTPATIENT)
Dept: PRIMARY CARE | Facility: CLINIC | Age: 60
End: 2025-06-30
Payer: COMMERCIAL

## 2025-06-30 DIAGNOSIS — M62.838 MUSCLE SPASM: Primary | ICD-10-CM

## 2025-06-30 RX ORDER — METHOCARBAMOL 500 MG/1
500 TABLET, FILM COATED ORAL 4 TIMES DAILY PRN
Qty: 20 TABLET | Refills: 1 | Status: SHIPPED | OUTPATIENT
Start: 2025-06-30 | End: 2026-06-30

## 2025-06-30 NOTE — TELEPHONE ENCOUNTER
Patient left [facility] requesting medication to help with back muscle spasms after yard work this weekend

## 2025-07-01 ENCOUNTER — OFFICE VISIT (OUTPATIENT)
Dept: ORTHOPEDIC SURGERY | Facility: CLINIC | Age: 60
End: 2025-07-01
Payer: COMMERCIAL

## 2025-07-01 ENCOUNTER — APPOINTMENT (OUTPATIENT)
Dept: RADIOLOGY | Facility: HOSPITAL | Age: 60
End: 2025-07-01
Payer: COMMERCIAL

## 2025-07-01 ENCOUNTER — APPOINTMENT (OUTPATIENT)
Dept: ORTHOPEDIC SURGERY | Facility: CLINIC | Age: 60
End: 2025-07-01
Payer: COMMERCIAL

## 2025-07-01 ENCOUNTER — HOSPITAL ENCOUNTER (EMERGENCY)
Facility: HOSPITAL | Age: 60
Discharge: HOME | End: 2025-07-01
Payer: COMMERCIAL

## 2025-07-01 VITALS
HEIGHT: 63 IN | HEART RATE: 102 BPM | DIASTOLIC BLOOD PRESSURE: 98 MMHG | WEIGHT: 175 LBS | TEMPERATURE: 98.1 F | RESPIRATION RATE: 18 BRPM | BODY MASS INDEX: 31.01 KG/M2 | SYSTOLIC BLOOD PRESSURE: 154 MMHG | OXYGEN SATURATION: 97 %

## 2025-07-01 DIAGNOSIS — M54.40 BACK PAIN OF LUMBOSACRAL REGION WITH SCIATICA: ICD-10-CM

## 2025-07-01 DIAGNOSIS — M51.26 LUMBAR HERNIATED DISC: ICD-10-CM

## 2025-07-01 DIAGNOSIS — M54.16 LUMBAR RADICULOPATHY: Primary | ICD-10-CM

## 2025-07-01 DIAGNOSIS — M54.40 BACK PAIN OF LUMBOSACRAL REGION WITH SCIATICA: Primary | ICD-10-CM

## 2025-07-01 PROCEDURE — 2500000004 HC RX 250 GENERAL PHARMACY W/ HCPCS (ALT 636 FOR OP/ED): Mod: JZ | Performed by: PHYSICIAN ASSISTANT

## 2025-07-01 PROCEDURE — 99213 OFFICE O/P EST LOW 20 MIN: CPT | Performed by: FAMILY MEDICINE

## 2025-07-01 PROCEDURE — 72131 CT LUMBAR SPINE W/O DYE: CPT | Performed by: RADIOLOGY

## 2025-07-01 PROCEDURE — 99284 EMERGENCY DEPT VISIT MOD MDM: CPT | Mod: 25

## 2025-07-01 PROCEDURE — 96372 THER/PROPH/DIAG INJ SC/IM: CPT | Performed by: PHYSICIAN ASSISTANT

## 2025-07-01 PROCEDURE — 72131 CT LUMBAR SPINE W/O DYE: CPT

## 2025-07-01 RX ORDER — KETOROLAC TROMETHAMINE 30 MG/ML
15 INJECTION, SOLUTION INTRAMUSCULAR; INTRAVENOUS ONCE
Status: COMPLETED | OUTPATIENT
Start: 2025-07-01 | End: 2025-07-01

## 2025-07-01 RX ORDER — KETOROLAC TROMETHAMINE 10 MG/1
10 TABLET, FILM COATED ORAL EVERY 6 HOURS PRN
Qty: 20 TABLET | Refills: 0 | Status: SHIPPED | OUTPATIENT
Start: 2025-07-01 | End: 2025-07-06

## 2025-07-01 RX ORDER — DIAZEPAM 5 MG/1
5 TABLET ORAL EVERY 8 HOURS PRN
Qty: 10 TABLET | Refills: 0 | Status: SHIPPED | OUTPATIENT
Start: 2025-07-01

## 2025-07-01 RX ORDER — FENTANYL CITRATE 50 UG/ML
50 INJECTION, SOLUTION INTRAMUSCULAR; INTRAVENOUS ONCE
Status: COMPLETED | OUTPATIENT
Start: 2025-07-01 | End: 2025-07-01

## 2025-07-01 RX ORDER — PREDNISONE 50 MG/1
50 TABLET ORAL DAILY
Qty: 5 TABLET | Refills: 0 | Status: SHIPPED | OUTPATIENT
Start: 2025-07-01 | End: 2025-07-06

## 2025-07-01 RX ORDER — CYCLOBENZAPRINE HCL 10 MG
10 TABLET ORAL 3 TIMES DAILY PRN
Qty: 15 TABLET | Refills: 0 | Status: SHIPPED | OUTPATIENT
Start: 2025-07-01 | End: 2025-07-08

## 2025-07-01 RX ADMIN — FENTANYL CITRATE 50 MCG: 50 INJECTION INTRAMUSCULAR; INTRAVENOUS at 10:03

## 2025-07-01 RX ADMIN — METHYLPREDNISOLONE SODIUM SUCCINATE 125 MG: 125 INJECTION, POWDER, FOR SOLUTION INTRAMUSCULAR; INTRAVENOUS at 10:03

## 2025-07-01 RX ADMIN — KETOROLAC TROMETHAMINE 15 MG: 30 INJECTION, SOLUTION INTRAMUSCULAR at 10:04

## 2025-07-01 ASSESSMENT — PAIN DESCRIPTION - LOCATION: LOCATION: BACK

## 2025-07-01 ASSESSMENT — PAIN DESCRIPTION - DESCRIPTORS: DESCRIPTORS: ACHING

## 2025-07-01 ASSESSMENT — PAIN DESCRIPTION - PAIN TYPE: TYPE: ACUTE PAIN

## 2025-07-01 ASSESSMENT — PAIN SCALES - GENERAL: PAINLEVEL_OUTOF10: 9

## 2025-07-01 ASSESSMENT — PAIN DESCRIPTION - ORIENTATION: ORIENTATION: LEFT

## 2025-07-01 ASSESSMENT — PAIN - FUNCTIONAL ASSESSMENT: PAIN_FUNCTIONAL_ASSESSMENT: 0-10

## 2025-07-01 NOTE — PROGRESS NOTES
Acute Injury New Patient Visit  Assessment & Plan  Lumbar radiculopathy  Acute exacerbation with left leg numbness, tingling, and burning pain, suggestive of nerve root irritation, possibly due to herniated disc.  - Order MRI of lumbar spine to assess for herniated disc or other pathology.  - Initiate physical therapy for muscle spasms and back traction.  - Provide home exercises for the back.  - Advise use of walker for ambulation and fall prevention.  - Instruct to take oral prednisone as prescribed, followed by Toradol post-prednisone.  - Recommend Flexeril up to three times daily, preferably at night.  - Advise use of heat for muscle relaxation and ice for pain relief.  - Instruct to take Tylenol for pain relief, avoid NSAIDs while on prednisone.  - Advise to contact clinic if symptoms worsen or if unable to bear weight for potential stat MRI.    Herniated lumbar disc  Possible herniated disc contributing to lumbar radiculopathy with left big toe numbness and weight-bearing intolerance.  - Order MRI of lumbar spine to confirm herniated disc.  - Follow up with spine team post-MRI for further evaluation and management.    Piriformis syndrome  Piriformis tenderness contributing to left-sided hip and buttock pain, overlapping with lumbar radiculopathy.  - Include piriformis syndrome in physical therapy regimen for muscle tightness and pain.    Additionally we will provide the patient with a short course of Valium for muscle relaxation in the event her Flexeril is not adequate which was prescribed in the emergency department.  Billing note: This medication was ordered on the date of service and order was placed outside of the visit encounter.  Orders Placed This Encounter    MR lumbar spine wo IV contrast    Referral to Physical Therapy     Procedures   At the conclusion of the visit there were no further questions by the patient/family regarding their plan of care.  Patient was instructed to call or return with  any issues, questions, or concerns regarding their injury and/or treatment plan described above.    PHYSICAL EXAM:  General:  Patient is awake, alert, and oriented to person place and time.  Patient appears well nourished and well kept.  Affect Calm, Not Acutely Distressed.  Heent:  Normocephalic, Atraumatic, EOMI  Cardiovascular:  Hemodynamically stable.  Respiratory:  Normal respirations with unlabored breathing.  Neuro: Gross sensation intact to the lower extremities bilaterally.  Extremity: Low back exam:  Physical Exam  MUSCULOSKELETAL: Tenderness to palpation down the midline of the lumbar spine. Left greater than right SI side joint pain. Piriformis tenderness worse on the left. Mild left greater trochanter bursa pain. Positive straight leg raise on the left, negative on the right. 4/5 hip flexion strength. Normal internal and external rotation. Unable to tolerate full weight bearing, seated in wheelchair. Pain in the thigh when pressing down like on a gas pedal.  NEUROLOGICAL: Patellar reflexes equal and symmetric.    IMAGING:   Results  RADIOLOGY  Lumbar spine CT: Stable compression fracture at L1, L2; no acute fracture (05/23/2025)  CT lumbar spine wo IV contrast  Narrative: Interpreted By:  Tru Roberson,   STUDY:  CT LUMBAR SPINE WO IV CONTRAST;  7/1/2025 9:53 am      INDICATION:  Signs/Symptoms:Severe pain radiates to left leg.      COMPARISON:  Correlation with plain films from 05/02/2023.   .      ACCESSION NUMBER(S):  [id number]      ORDERING CLINICIAN:  GIL NAVARRO      TECHNIQUE:  Thin section axial images were obtained from T11 down through the mid  sacrum. Sagittal and coronal reconstruction images were generated.  Soft tissue and bone windows were reviewed.      FINDINGS:  VERTEBRAL BODIES AND POSTERIOR ELEMENTS: Stable old superior endplate  compression fracture and Schmorl's node at L2. Lumbar disc space  height is preserved throughout. Mild scattered lumbar spine endplate  osteophytosis.  Mild-to-moderate disc space narrowing with endplate  spurring and vacuum disc phenomenon at T10-11 and T11-12. Mild  interfacet hypertrophy at L2-3, L4-5 and L5-S1. No acute compression  fracture or posterior element fracture. No listhesis.  No destructive  bone lesion.      SPINAL CANAL:  At T11-12, there is no focal disc herniation or exiting nerve root  compression.      At T12-L1, no focal disc herniation or exiting nerve root compression.      At L1-2, no focal disc herniation or exiting nerve root compression.      At L2-3, no focal disc herniation or exiting nerve root compression.      At L3-4, mild broad-based posterior disc bulge with flattening of the  ventral aspect of the thecal sac. Mild ligamentum flavum hypertrophy.  No focal disc herniation. There is mild loss of fat adjacent to the  exiting nerve root on the left at L3-4. No gross exiting nerve root  compression on the right at this level.      At L4-5, there is mild broad-based posterior disc bulge with  flattening of the ventral aspect of the thecal sac. No focal disc  herniation. No gross exiting nerve root compression.      At L5-S1, mild posterior disc bulge. No focal disc herniation. No  exiting nerve root compression.      SOFT TISSUES: Within normal limits.      ABDOMEN/PELVIS: Mild-to-moderate aortoiliac calcifications. Previous  cholecystectomy. Mild sigmoid diverticulosis. Apparent previous  hysterectomy. Otherwise, the imaged portions of the abdomen and  pelvis were negative.      Impression: Arthritic changes in the lumbosacral spine as described. Suspected  mild left-sided nerve root compression at L3-4. Please see above for  details.      Stable sparing plate compression fracture and Schmorl's node at L2.      Sigmoid diverticulosis.      Previous cholecystectomy. Apparent previous hysterectomy.      MACRO:  None      Signed by: Tru Roberson 7/1/2025 10:22 AM  Dictation workstation:   [id number]      Patient ID: Ivy I Rusty is a  59 y.o. female who presents for No chief complaint on file..  History of Present Illness  Ivy Ojeda is a 59 year old female with a history of spine issues who presents with lower back pain radiating to the left leg.    She experiences persistent lower back pain radiating down her left leg, hip, and to the front, reaching her big toe and the adjacent toe, which feel 'numbish'. The pain is described as hardly ever-ending, with muscle spasms being the most severe symptom.    She received two pain shots at [place] recently, providing relief for about an hour. Additionally, prednisone was administered in her arm, but symptoms have since returned. A CT scan in May 2023 identified a stable compression fracture at L1, L2. She has a history of spine issues, including a herniated disc surgery at [facility] in 1998.    No bowel or bladder incontinence is reported. She was prescribed a steroid, a muscle relaxer, and Toradol, though she only received a couple of tablets. She has not engaged in physical therapy recently.    She has difficulty walking, stating she was able to walk into her house in the morning but is now limping and experiencing wrenching pain. She uses a walker to alleviate pressure on her back while walking and is concerned about her ability to drive. 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    Current medications include prednisone, Flexeril (10 mg, up to three times a day for seven days), and Toradol (one tablet every six hours, to be taken after completing the prednisone course). She also takes Tylenol for pain relief and uses muscle rubs and creams, alternating between ice and heat for symptom management.    During the review of symptoms, she reports no pain in the calf but experiences pulling in the thigh when straightening the leg and pressing down. She cannot fully bear weight on the affected leg and describes pain in the hip and left side of her back, with a knot that  responds to pressure. Tenderness is noted in the buttock and down the leg.        This medical note was created with the assistance of artificial intelligence (AI) for documentation purposes. The content has been reviewed and confirmed by the healthcare provider for accuracy and completeness. Patient consented to the use of audio recording and use of AI during their visit.   07/01/25 at 6:07 PM - Cole C Budinsky, MD  Office:  [phone]

## 2025-07-01 NOTE — Clinical Note
Ivy Ojeda was seen and treated in our emergency department on 7/1/2025.  She may return to work on 07/03/2025.       If you have any questions or concerns, please don't hesitate to call.      Piyush Evangelista PA-C

## 2025-07-01 NOTE — ED PROVIDER NOTES
HPI   Chief Complaint   Patient presents with    Back Pain     LL back pain that radiates down the L leg after gardening on Saturday        A 59-year-old female patient comes into the emergency department today with complaints of severe low back pain that is radiating down her left leg.  States she is finding it very difficult to get comfortable.  She rates her pain currently 9 out of 10 on the pain scale.  Described as very sharp.  Certain movements or sitting make it worse.  Denies any associated saddle anesthesia, loss of bowel control or urinary retention.  States she has known compression fractures of L1 and L2 from many years ago.  States this flared up to several days ago after she mowed the lawn.  For this purpose she comes in the emergency department today for the evaluation.  Otherwise no complaints present time.              Patient History   Medical History[1]  Surgical History[2]  Family History[3]  Social History[4]    Physical Exam   ED Triage Vitals   Temperature Heart Rate Respirations BP   07/01/25 0918 07/01/25 0918 07/01/25 0918 07/01/25 0920   36.7 °C (98.1 °F) (!) 102 18 (!) 154/98      Pulse Ox Temp Source Heart Rate Source Patient Position   07/01/25 0918 07/01/25 0918 07/01/25 0918 --   97 % Temporal Monitor       BP Location FiO2 (%)     -- --             Physical Exam      ED Course & MDM   Diagnoses as of 07/01/25 1051   Back pain of lumbosacral region with sciatica                 No data recorded     [place] Coma Scale Score: 15 (07/01/25 0919 : Dania Turner RN)                           Medical Decision Making  A 59-year-old female patient comes into the emergency department today with complaints of severe low back pain that is radiating down her left leg.  States she is finding it very difficult to get comfortable.  She rates her pain currently 9 out of 10 on the pain scale.  Described as very sharp.  Certain movements or sitting make it worse.  Denies any associated saddle  anesthesia, loss of bowel control or urinary retention.  States she has known compression fractures of L1 and L2 from many years ago.  States this flared up to several days ago after she mowed the lawn.  For this purpose she comes in the emergency department today for the evaluation.  Otherwise no complaints present time.    CT lumbar spine ordered to rule out any acute abnormalities.  IM fentanyl IM Toradol IM Solu-Medrol ordered for patient's pain.      Patient CT study shows arthritic changes of the lumbar spine as well as mild left-sided nerve root compression at L3-4.  Patient has stable compression fracture.    Will discharge patient with close follow-up with orthopedics.  She agrees with this plan expressed verbal understanding.  Questions were answered.    Historian is the patient    Diagnosis: Lumbosacral pain with sciatica    Labs Reviewed - No data to display     CT lumbar spine wo IV contrast   Final Result   Arthritic changes in the lumbosacral spine as described. Suspected   mild left-sided nerve root compression at L3-4. Please see above for   details.        Stable sparing plate compression fracture and Schmorl's node at L2.        Sigmoid diverticulosis.        Previous cholecystectomy. Apparent previous hysterectomy.        MACRO:   None        Signed by: Tru Roberson 7/1/2025 10:22 AM   Dictation workstation:   [id number]            Procedure  Procedures       [1]   Past Medical History:  Diagnosis Date    Acute nasopharyngitis (common cold)     Nasopharyngitis    Candidiasis, unspecified     Antibiotic-induced yeast infection    Diverticulosis of intestine, part unspecified, without perforation or abscess without bleeding     Diverticulosis    Encounter for general adult medical examination without abnormal findings 11/29/2022    Annual physical exam    Encounter for screening for malignant neoplasm of colon     Screening for malignant neoplasm of colon    Erythema intertrigo     Intertrigo     Frequent UTI     GERD (gastroesophageal reflux disease)     High cholesterol     Hypertension     Other forms of angina pectoris     Anginal equivalent    Other forms of dyspnea     Dyspnea on minimal exertion    Personal history of diseases of the skin and subcutaneous tissue     History of rosacea    Personal history of other diseases of the nervous system and sense organs     History of otitis media    Personal history of other diseases of the respiratory system     History of acute bronchitis    Personal history of other diseases of the respiratory system     History of acute sinusitis    Personal history of other specified conditions     History of vertigo    Personal history of other specified conditions     History of palpitations    Personal history of other specified conditions     History of breast lump    Personal history of other specified conditions     History of dysuria    Personal history of other specified conditions     History of weight gain    Personal history of urinary (tract) infections     History of urinary tract infection    PONV (postoperative nausea and vomiting)     Pruritus vulvae 2020    Vulvar itching    Shortness of breath     SOB (shortness of breath) on exertion    Unspecified nonsuppurative otitis media, unspecified ear     Middle ear effusion   [2]   Past Surgical History:  Procedure Laterality Date    CERVICAL SPINE SURGERY  10/31/2021    Neck surgery     SECTION, CLASSIC  2020     section    CHOLECYSTECTOMY  10/31/2021    Cholecystectomy    COLONOSCOPY  10/31/2021    Complete colonoscopy    CYST REMOVAL      FOOT SURGERY Left 2020    Foot surgery    HYSTERECTOMY  2021    Hysterectomy total    TONSILLECTOMY  2020    Tonsillectomy   [3]   Family History  Problem Relation Name Age of Onset    Diabetes Mother      Hypertension Mother      Hyperlipidemia Mother      Coronary artery disease Mother      Diabetes Father      Hypertension  Father      Hyperlipidemia Father      Coronary artery disease Father     [4]   Social History  Tobacco Use    Smoking status: Former     Current packs/day: 0.00     Types: Cigarettes     Start date:      Quit date:      Years since quittin.5     Passive exposure: Past    Smokeless tobacco: Never   Vaping Use    Vaping status: Never Used   Substance Use Topics    Alcohol use: Never    Drug use: Never        Piyush Evangelista PA-C  25 1051

## 2025-07-07 DIAGNOSIS — M54.40 BACK PAIN OF LUMBOSACRAL REGION WITH SCIATICA: ICD-10-CM

## 2025-07-07 DIAGNOSIS — M54.16 LUMBAR RADICULOPATHY: ICD-10-CM

## 2025-07-07 RX ORDER — CYCLOBENZAPRINE HCL 10 MG
10 TABLET ORAL 3 TIMES DAILY PRN
Qty: 30 TABLET | Refills: 0 | Status: SHIPPED | OUTPATIENT
Start: 2025-07-07 | End: 2025-07-17

## 2025-07-08 ENCOUNTER — HOSPITAL ENCOUNTER (OUTPATIENT)
Dept: RADIOLOGY | Facility: HOSPITAL | Age: 60
Discharge: HOME | End: 2025-07-08
Payer: COMMERCIAL

## 2025-07-08 DIAGNOSIS — M54.40 BACK PAIN OF LUMBOSACRAL REGION WITH SCIATICA: ICD-10-CM

## 2025-07-08 DIAGNOSIS — M54.16 LUMBAR RADICULOPATHY: ICD-10-CM

## 2025-07-08 DIAGNOSIS — M51.26 LUMBAR HERNIATED DISC: ICD-10-CM

## 2025-07-08 PROCEDURE — 72148 MRI LUMBAR SPINE W/O DYE: CPT | Performed by: RADIOLOGY

## 2025-07-08 PROCEDURE — 72148 MRI LUMBAR SPINE W/O DYE: CPT

## 2025-07-11 ENCOUNTER — APPOINTMENT (OUTPATIENT)
Dept: PHYSICAL THERAPY | Facility: CLINIC | Age: 60
End: 2025-07-11
Payer: COMMERCIAL

## 2025-07-11 DIAGNOSIS — M51.26 LUMBAR HERNIATED DISC: ICD-10-CM

## 2025-07-11 DIAGNOSIS — M54.16 LUMBAR RADICULOPATHY: ICD-10-CM

## 2025-07-11 DIAGNOSIS — M54.40 BACK PAIN OF LUMBOSACRAL REGION WITH SCIATICA: ICD-10-CM

## 2025-07-11 PROCEDURE — 97162 PT EVAL MOD COMPLEX 30 MIN: CPT | Mod: GP | Performed by: PHYSICAL THERAPIST

## 2025-07-11 ASSESSMENT — PAIN DESCRIPTION - DESCRIPTORS: DESCRIPTORS: OTHER (COMMENT)

## 2025-07-11 ASSESSMENT — PAIN - FUNCTIONAL ASSESSMENT: PAIN_FUNCTIONAL_ASSESSMENT: 0-10

## 2025-07-11 ASSESSMENT — PAIN SCALES - GENERAL: PAINLEVEL_OUTOF10: 2

## 2025-07-11 NOTE — LETTER
July 11, 2025    Gold August, PT  [address] [place], [address] [place] [address]    Patient: Ivy Ojeda   YOB: 1965   Date of Visit: 7/11/2025       Dear Cole C Budinsky, MD  [address]   [facility], [address] [place] [address],  [place] [address]    The attached plan of care is being sent to you because your patient’s medical reimbursement requires that you certify the plan of care. Your signature is required to allow uninterrupted insurance coverage.      You may indicate your approval by signing below and faxing this form back to us at Dept Fax: [phone].    Please call Dept: [phone] with any questions or concerns.    Thank you for this referral,        Gold August, PT  [place] [address] [facility]  [address] [facility] [address]    Payer: Payor: [employer] [place] / Plan: [employer] / Product Type: *No Product type* /                                                                         Date:     Dear Gold August, PT,     Re: Ms. Ivy Ojeda, MRN:[id number]    I certify that I have reviewed the attached plan of care and it is medically necessary for Ms. Ivy Ojeda (1965) who is under my care.          ______________________________________                    _________________  Provider name and credentials                                           Date and time                                                                                           Plan of Care 7/11/25   Effective from: 7/11/2025  Effective to: 10/9/2025    Plan ID: [id number]            Participants as of Finalize on 7/11/2025    Name Type Comments Contact Info    Cole C Budinsky, MD Referring Provider  [phone]    Gold August PT Physical Therapist  [phone]       Last Plan Note     Author: Gold August PT Status: Incomplete Last edited: 7/11/2025  8:30 AM       PT  Initial Evaluation    Patient Name:  Ivy Ojeda    MRN:  [id number]    :  1965    Today's Date:  25    Time Calculation  Start Time: 830  Stop Time: 900  Time Calculation (min): 30 min  PT Evaluation Time Entry  PT Evaluation (Moderate) Time Entry: 30          Informed Consent  Patient has been informed of all evaluation findings and treatment plans and agrees to participate in Physical Therapy services and plans as outlined.    Diagnosis:  Diagnosis and Precautions: M54.40 (ICD-10-CM) - Back pain of lumbosacral region with sciatica  M54.16 (ICD-10-CM) - Lumbar radiculopathy  M51.26 (ICD-10-CM) - Lumbar herniated disc    Goals:   1. Pain Management  Goal: Within 2 weeks, the patient will report a reduction in low back pain from 2/10 to 0/10 or less during routine daily tasks (e.g., standing, walking) and rest, as measured by a 0-10 pain scale.    2. Range of Motion (ROM) and Flexibility  Goal: Within 4 weeks, the patient will increase lumbar spine flexion and extension by at least 10° (as measured by an inclinometer or [id number] test), allowing more comfortable bending and reaching activities.    3. Strength and Endurance  Goal: Within 6 weeks, the patient will improve core and lumbar extensor strength to perform a plank (modified or standard) for 30 seconds with proper alignment and without pain exceeding 3/10.    4. Functional Activities / ADLs  Goal: Within 4 weeks, the patient will independently perform basic daily tasks (e.g., tying shoes, doing laundry) with no more than 2/10 pain and no significant compensatory movements (e.g., excessive trunk bending).    5. Work Goals  Goal: Within 5-6 weeks, the patient will safely resume essential work duties (e.g., prolonged sitting, lifting light objects) with no more than 2/10 pain.    6. Education, Self-Management, and Compliance  Goal: By the second therapy visit, the patient will accurately demonstrate their prescribed home exercise  program (HEP) for lumbar stabilization and flexibility, understanding proper form and necessary modifications for pain or fatigue.    7. Sleep and Pain-Free Rest  Goal: Within 2 weeks, the patient will identify a comfortable sleep position (e.g., side-lying with pillow support or supine with knee support) and report improved sleep quality (waking with 2/10 or less low back pain), as recorded in a sleep diary.    8. Long-Term Maintenance and Prevention  Goal: By discharge (approximately 5-6 weeks), the patient will establish a regular core stabilization and stretching routine to prevent recurrence of low back pain, with no significant flare-ups (>3/10) during normal daily or recreational activities.  9. Lower Back Pain (Oswestry Disability Index)  Goal: Within 6 weeks, the patient’s Oswestry Disability Index (ELDER) score will decrease from 33 to 23, indicating improved functional ability and reduced disability during routine daily activities (e.g., standing, bending).     Plan of Care:      Treatment/Interventions: Aquatic therapy, Cryotherapy, Dry needling, Education/ Instruction, Electrical stimulation, Gait training, Manual therapy, Mechanical traction, Neuromuscular re-education, Self care/ home management, Taping techniques, Therapeutic activities, Therapeutic exercises  PT Plan: Skilled PT  PT Frequency:  (1-2x/week)  Duration: 10 visits  Onset Date: 06/29/25  Certification Period Start Date: 07/11/25  Certification Period End Date: 10/09/25  Number of Treatments Authorized: 10  Rehab Potential: Good  Plan of Care Agreement: Patient    PT Assessment:    Patient is a 59 y.o. FEMALE with c/o lower back pain, L LE pain.   Patient is alert and oriented x 3.  Patient presents with medical diagnosis of M54.40 (ICD-10-CM) - Back pain of lumbosacral region with sciatica M54.16 (ICD-10-CM) - Lumbar radiculopathy M51.26 (ICD-10-CM) - Lumbar herniated disc  contributing to compensatory soft tissue dysfunction, pain,  stiffness and weakness of the lower back.   Significant past medical history/past surgical history includes see below.    Skilled care is needed to progress the patient back to these activities without exacerbating symptoms.   Patient requires skilled PT services to address the problems identified and the individualized patient's goals as outlined in the problems and goals section of this evaluation.  A skilled PT is required to address these key impairments and to provide and progress with an appropriate home exercise program. Patient does have any significant PMH influencing Rx and reports motivation to return to FUNCTIONAL ACTIVITY.   Patient demonstrates to be a good candidate for physical therapy with good rehab potential and verbalized a good understanding of HER diagnosis, prognosis and treatment.  Goals have been established and reviewed with the patient.  Within MD's clinical note it states to have patient go to a clinic with lumbar traction (we do not).  I will have patient transfer over to [facility] for this.    PT Assessment Results: Decreased strength, Decreased range of motion, Decreased endurance, Decreased mobility, Pain  Rehab Prognosis: Good  Evaluation/Treatment Tolerance: Patient limited by fatigue, Patient limited by pain    Complexity:  Moderate complexity evaluation  due to a 30 minute duration, a past medical history WITH any personal factors and/or comorbidities that could impact the POC, examination of body systems completed on one to two elements, the patient presents with a stable condition, and clinical decision making using the ELDER was of moderate complexity.     Prognosis:  Rehab Prognosis: Good    Problem List  Activity Limitations, Decreased Functional Level, Decreased knowledge of HEP, Flexibility, Gait issues, Pain, Range of Motion/joint mobility issues, Strength, and Endurance    Impairments   IMPAIRED ROM LOWER BODY, IMPAIRED STRENGTH LOWER  BODY, IMPAIRED GAIT, IMPAIRED CORE STABILITY, INCREASED PAIN, IMPAIRED FUNCTIONAL ACTIVITY LEVEL, and IMPAIRED FUNCTIONAL MOVEMENT PATTERNS    Functional Limitations:  LIMITATIONS PERFORMING BASIC ADL'S, ISSUES WITH SLEEP, WORK ISSUES, PARTICIPATION IN HOBBIES, PARTICIPATION IN LEISURE ACTIVITIES, and PARTICIPATION IN HOME MANAGEMENT    General Visit Information:  Reason for Referral: PT Evaluation  Referred By: Dr. Cole Budinsky  General Comment: M54.40 (ICD-10-CM) - Back pain of lumbosacral region with sciatica  M54.16 (ICD-10-CM) - Lumbar radiculopathy  M51.26 (ICD-10-CM) - Lumbar herniated disc    Pre-Cautions:  LOISADI Fall Risk Score (The score of 4 or more indicates an increased risk of falling): 0     Medical Precautions:  (neck surgery 1998, L foot surgery history of, HTN, high cholesterol, RA))     Reason for Visit:  PT Evaluate and Treat    Initial Evaluation:  Referred By: Dr. Cole Budinsky    Insurance  Insurance reviewed  Name of Insurance:  [facility]  Visit No.  1  * (EVAL) BACK PAIN LS REGION WITH SCIATICA M54.40; LUMBAR RADICULOPATHY M54.16L KYNBAR GERBUATED [facility] N51.26; MEDICAL [facility]:     Subjective:    Current Episode  Date of Onset:  6/29/2025  Mechanism of injury:  Patient reports mowing the yard and weed wacking on 6/29/25 and the next morning she leaned over to get some coffee and she started having left lower back pain, L LE pain.  Chief Complaint:  L lower back pain, L buttock pain, L quadriceps pain that radiates to the L knee, and L big toe/2nd toe pain  Progression of symptoms:  better over time  Previous treatments:  none  Relevant medical history:  Neck surgery 1998    Pain Score:  Pain Assessment: 0-10  Pain Assessment  Pain Assessment: 0-10  0-10 (Numeric) Pain Score: 2 (2/10 least, 10/10 worst)  Pain Type: Acute pain  Pain Location: Back  Pain Orientation: Left, Lower, Posterior  Pain Radiating Towards: L buttock, L anterior thigh to L knee to L big toe, L second toe  Pain  Descriptors: Other (Comment) (wrenching, like being stretched)  Pain Frequency: Constant/continuous  Pain Onset: Ongoing  Pain Type: Acute pain  Pain Location: Back  Pain Orientation: Left, Lower, Posterior  Pain Descriptors: Other (Comment) (wrenching, like being stretched)  Pain Frequency: Constant/continuous    Better with:  reclining, Advil, muscle relaxers    Worse with:  sitting, walking, standing, bending, lifting, in/out of car/shower/bed, sleeping    Medical History/Surgical History:  Medical Precautions:  (neck surgery 1998, L foot surgery history of, HTN, high cholesterol, RA)    Reviewed medical history form with patient (medications/allergies reviewed with patient).  Current Outpatient Medications   Medication Instructions   • albuterol 90 mcg/actuation inhaler 2 puffs, inhalation, Every 6 hours PRN   • aspirin (Adult Low Dose Aspirin) 81 mg EC tablet 1 tablet, Daily   • calcium carbonate-vitamin D3 600 mg-5 mcg (200 unit) tablet 1 tablet, Daily   • cholecalciferol (VITAMIN D-3) 2,000 Units, Daily   • cyanocobalamin (VITAMIN B-12) 250 mcg, Daily   • cyclobenzaprine (FLEXERIL) 10 mg, oral, 3 times daily PRN   • cyclobenzaprine (FLEXERIL) 10 mg, oral, 3 times daily PRN   • diazePAM (VALIUM) 5 mg, oral, Every 8 hours PRN   • EPINEPHrine 0.3 mg/0.3 mL injection syringe 1 Syringe, Once as needed   • fish oil concentrate (Omega-3) 120-180 mg capsule 1 capsule, Daily   • fluticasone (Flonase) 50 mcg/actuation nasal spray 2 sprays, Daily   • fluticasone propion-salmeteroL (Advair Diskus) 500-50 mcg/dose diskus inhaler 1 puff, inhalation, 2 times daily RT, Rinse mouth with water after use to reduce aftertaste and incidence of candidiasis. Do not swallow.   • hydroCHLOROthiazide (MICROZIDE) 12.5 mg, oral, Daily   • magnesium oxide (MAG-OX) 400 mg   • methocarbamol (ROBAXIN) 500 mg, oral, 4 times daily PRN   • montelukast (SINGULAIR) 10 mg, oral, Nightly   • pantoprazole (PROTONIX) 40 mg, oral, Daily before  breakfast   • rosuvastatin (CRESTOR) 5 mg, oral, Daily   • theanine 100 mg capsule 1 capsule, Every other day   • vitamin E 400 Units, Daily     Radiology:    Exam Information    Status Exam Begun Exam Ended   Final 7/08/2025 14:39 7/08/2025 15:13     Study Result    Narrative & Impression   Interpreted By:  Marsha Pendleton and Stephens Katherine   STUDY:  MR LUMBAR SPINE WO IV CONTRAST;  7/8/2025 3:13 pm      INDICATION:  Signs/Symptoms:pain.      ,M54.40 Lumbago with sciatica, unspecified side,M54.16 Radiculopathy,  lumbar region,M51.26 Other intervertebral disc displacement, lumbar  region      COMPARISON:  CT lumbar spine 07/01/2025      ACCESSION NUMBER(S):  [id number]      ORDERING CLINICIAN:  COLE BUDINSKY      TECHNIQUE:  Sagittal T1, T2, STIR, axial T1 and T2 weighted images of the lumbar  spine were acquired.      FINDINGS:  There are 5 lumbar type vertebral bodies. The last rib-bearing  vertebra is labeled T12.      Alignment: The vertebral alignment is maintained. There is  straightening of the normal lumbar lordosis.      Vertebrae/Intervertebral Discs: The vertebral bodies demonstrate  expected height. A Schmorl's node is noted in the superior endplate  of L2, similar to prior. The marrow signal is within normal limits.  Mild loss of intervertebral disc height L3-4 and L4-5. there is  desiccated disc signal throughout the lower thoracic and lumbar spine.      Conus: The lower thoracic cord appears unremarkable. The conus  terminates at L1-2.      There are degenerative changes at T10-T11 and T11-T12 without  high-grade spinal canal stenosis. No axial images were performed  through these levels.      T12-L1: No significant spinal canal or neural foraminal stenosis.      L1-2: No significant spinal canal or neural foraminal stenosis.  Minimal disc bulge.      L2-3: Disc bulge and facet arthropathy without significant spinal  canal or neural foraminal stenosis.      L3-4: Disc bulge and  left-greater-than-right facet arthropathy  causing mild-to-moderate left neural foraminal stenosis. No  significant spinal canal stenosis. No significant right neural  foraminal stenosis.      L4-5: Disc bulge, ligamentum flavum hypertrophy, and facet arthrosis  causing mild spinal canal stenosis and mild narrowing the  left-greater-than-right subarticular recesses. There is mild  bilateral neural foraminal stenosis.      L5-S1:  Disc bulge, ligamentum flavum hypertrophy, and facet  arthrosis without significant spinal canal or neural foraminal  stenosis.      The prevertebral and posterior paraspinous soft tissues are  unremarkable.      IMPRESSION:  Mild degenerative changes of the lumbar spine most pronounced at  L4-L5 with mild spinal canal stenosis and mild bilateral neural  foraminal stenosis.     For lower back/neck only:  Previous PT:  NO  Previous chiropractic:  NO  Previous acupuncture:  NO  Previous pain management:  NO  Lower back surgery:  NO  Neck surgery:  YES    Functional Assessment:  Level of Patrick:  Level of Patrick: Independent with ADLs and functional transfers    Social History:    Occupation:  Admin  Work Status:  EMPLOYED  Patient Awareness:  Patient is aware of HER diagnosis and prognosis.  Social Support/History:  LIVES ALONE    Objective:    Weightbearing Status:  WBAT L LE    Skin:  skin intact over lower back    Palpation:   point tenderness over L lumbar paraspinals    Sensation:  Patient denies numbness/tingling of bilateral LOWER extremities at present time.    Gait:  Moderate antalgic gait L LE    ROM:  AROM  Lumbar flexion 20 degrees with pain  Extension 10 degrees with lower back pain  SB R end range pain  L end range pain and L LE pain  Rotation 50% reduced R and L  L hip AROM  Flexion 100 degrees with pain  ER 25 degrees with pain  IR limited  R knee AROM WNL's, L knee AROM WNL's, R ankle/foot AROM WNL's, and L ankle/foot AROM WNL's    Strength:    L hip flexion 4-/5  with pain  L quadriceps 4-/5 with pain  L hamstrings 4-/5 with pain  R knee 5/5 all planes, R hip 5/5 all planes, R ankle/foot 5/5 all planes, and L ankle/foot 5/5 all planes    Special Tests:  + SLR on L, + slump test on L    Outcome measure     Oswestry Disablity Index (ELDER): 33     Treatment  Time in clinic started at  8:30am  Time in clinic ended at  9am  Total time in clinic is . 30 minutes  Total timed code time is  30 minutes    Treatment Performed Today:.   PT Initial Evaluation   Diagnosis and Precautions: M54.40 (ICD-10-CM) - Back pain of lumbosacral region with sciatica  M54.16 (ICD-10-CM) - Lumbar radiculopathy  M51.26 (ICD-10-CM) - Lumbar herniated disc            Current Participants as of 7/11/2025    Name Type Comments Contact Info    Cole C Budinsky, MD Referring Provider  [phone]    Signature pending    Gold August PT Physical Therapist  [phone]    Signature pending

## 2025-07-11 NOTE — PROGRESS NOTES
PT Initial Evaluation    Patient Name:  Ivy Ojeda    MRN:  [id number]    :  1965    Today's Date:  25    Time Calculation  Start Time: 830  Stop Time: 900  Time Calculation (min): 30 min  PT Evaluation Time Entry  PT Evaluation (Moderate) Time Entry: 30          Informed Consent  Patient has been informed of all evaluation findings and treatment plans and agrees to participate in Physical Therapy services and plans as outlined.    Diagnosis:  Diagnosis and Precautions: M54.40 (ICD-10-CM) - Back pain of lumbosacral region with sciatica  M54.16 (ICD-10-CM) - Lumbar radiculopathy  M51.26 (ICD-10-CM) - Lumbar herniated disc    Goals:   1. Pain Management  Goal: Within 2 weeks, the patient will report a reduction in low back pain from 2/10 to 0/10 or less during routine daily tasks (e.g., standing, walking) and rest, as measured by a 0-10 pain scale.    2. Range of Motion (ROM) and Flexibility  Goal: Within 4 weeks, the patient will increase lumbar spine flexion and extension by at least 10° (as measured by an inclinometer or [id number] test), allowing more comfortable bending and reaching activities.    3. Strength and Endurance  Goal: Within 6 weeks, the patient will improve core and lumbar extensor strength to perform a plank (modified or standard) for 30 seconds with proper alignment and without pain exceeding 3/10.    4. Functional Activities / ADLs  Goal: Within 4 weeks, the patient will independently perform basic daily tasks (e.g., tying shoes, doing laundry) with no more than 2/10 pain and no significant compensatory movements (e.g., excessive trunk bending).    5. Work Goals  Goal: Within 5-6 weeks, the patient will safely resume essential work duties (e.g., prolonged sitting, lifting light objects) with no more than 2/10 pain.    6. Education, Self-Management, and Compliance  Goal: By the second therapy visit, the patient will accurately demonstrate their prescribed home exercise  program (HEP) for lumbar stabilization and flexibility, understanding proper form and necessary modifications for pain or fatigue.    7. Sleep and Pain-Free Rest  Goal: Within 2 weeks, the patient will identify a comfortable sleep position (e.g., side-lying with pillow support or supine with knee support) and report improved sleep quality (waking with 2/10 or less low back pain), as recorded in a sleep diary.    8. Long-Term Maintenance and Prevention  Goal: By discharge (approximately 5-6 weeks), the patient will establish a regular core stabilization and stretching routine to prevent recurrence of low back pain, with no significant flare-ups (>3/10) during normal daily or recreational activities.  9. Lower Back Pain (Oswestry Disability Index)  Goal: Within 6 weeks, the patient’s Oswestry Disability Index (ELDER) score will decrease from 33 to 23, indicating improved functional ability and reduced disability during routine daily activities (e.g., standing, bending).     Plan of Care:      Treatment/Interventions: Aquatic therapy, Cryotherapy, Dry needling, Education/ Instruction, Electrical stimulation, Gait training, Manual therapy, Mechanical traction, Neuromuscular re-education, Self care/ home management, Taping techniques, Therapeutic activities, Therapeutic exercises  PT Plan: Skilled PT  PT Frequency:  (1-2x/week)  Duration: 10 visits  Onset Date: 06/29/25  Certification Period Start Date: 07/11/25  Certification Period End Date: 10/09/25  Number of Treatments Authorized: 10  Rehab Potential: Good  Plan of Care Agreement: Patient    PT Assessment:    Patient is a 59 y.o. FEMALE with c/o lower back pain, L LE pain.   Patient is alert and oriented x 3.  Patient presents with medical diagnosis of M54.40 (ICD-10-CM) - Back pain of lumbosacral region with sciatica M54.16 (ICD-10-CM) - Lumbar radiculopathy M51.26 (ICD-10-CM) - Lumbar herniated disc  contributing to compensatory soft tissue dysfunction, pain,  stiffness and weakness of the lower back.   Significant past medical history/past surgical history includes see below.    Skilled care is needed to progress the patient back to these activities without exacerbating symptoms.   Patient requires skilled PT services to address the problems identified and the individualized patient's goals as outlined in the problems and goals section of this evaluation.  A skilled PT is required to address these key impairments and to provide and progress with an appropriate home exercise program. Patient does have any significant PMH influencing Rx and reports motivation to return to FUNCTIONAL ACTIVITY.   Patient demonstrates to be a good candidate for physical therapy with good rehab potential and verbalized a good understanding of HER diagnosis, prognosis and treatment.  Goals have been established and reviewed with the patient.  Within MD's clinical note it states to have patient go to a clinic with lumbar traction (we do not).  I will have patient transfer over to [facility] on [facility] for this.    PT Assessment Results: Decreased strength, Decreased range of motion, Decreased endurance, Decreased mobility, Pain  Rehab Prognosis: Good  Evaluation/Treatment Tolerance: Patient limited by fatigue, Patient limited by pain    Complexity:  Moderate complexity evaluation  due to a 30 minute duration, a past medical history WITH any personal factors and/or comorbidities that could impact the POC, examination of body systems completed on one to two elements, the patient presents with a stable condition, and clinical decision making using the ELDER was of moderate complexity.     Prognosis:  Rehab Prognosis: Good    Problem List  Activity Limitations, Decreased Functional Level, Decreased knowledge of HEP, Flexibility, Gait issues, Pain, Range of Motion/joint mobility issues, Strength, and Endurance    Impairments   IMPAIRED ROM LOWER BODY, IMPAIRED STRENGTH LOWER  BODY, IMPAIRED GAIT, IMPAIRED CORE STABILITY, INCREASED PAIN, IMPAIRED FUNCTIONAL ACTIVITY LEVEL, and IMPAIRED FUNCTIONAL MOVEMENT PATTERNS    Functional Limitations:  LIMITATIONS PERFORMING BASIC ADL'S, ISSUES WITH SLEEP, WORK ISSUES, PARTICIPATION IN HOBBIES, PARTICIPATION IN LEISURE ACTIVITIES, and PARTICIPATION IN HOME MANAGEMENT    General Visit Information:  Reason for Referral: PT Evaluation  Referred By: Dr. Cole Budinsky  General Comment: M54.40 (ICD-10-CM) - Back pain of lumbosacral region with sciatica  M54.16 (ICD-10-CM) - Lumbar radiculopathy  M51.26 (ICD-10-CM) - Lumbar herniated disc    Pre-Cautions:  LOISADI Fall Risk Score (The score of 4 or more indicates an increased risk of falling): 0     Medical Precautions:  (neck surgery 1998, L foot surgery history of, HTN, high cholesterol, RA))     Reason for Visit:  PT Evaluate and Treat    Initial Evaluation:  Referred By: Dr. Cole Budinsky    Insurance  Insurance reviewed  Name of Insurance:  [facility]  Visit No.  1  * (EVAL) BACK PAIN LS REGION WITH SCIATICA M54.40; LUMBAR RADICULOPATHY M54.16L KYNBAR GERBUATED [facility] N51.26; MEDICAL [facility]:     Subjective:    Current Episode  Date of Onset:  6/29/2025  Mechanism of injury:  Patient reports mowing the yard and weed wacking on 6/29/25 and the next morning she leaned over to get some coffee and she started having left lower back pain, L LE pain.  Chief Complaint:  L lower back pain, L buttock pain, L quadriceps pain that radiates to the L knee, and L big toe/2nd toe pain  Progression of symptoms:  better over time  Previous treatments:  none  Relevant medical history:  Neck surgery 1998    Pain Score:  Pain Assessment: 0-10  Pain Assessment  Pain Assessment: 0-10  0-10 (Numeric) Pain Score: 2 (2/10 least, 10/10 worst)  Pain Type: Acute pain  Pain Location: Back  Pain Orientation: Left, Lower, Posterior  Pain Radiating Towards: L buttock, L anterior thigh to L knee to L big toe, L second toe  Pain  Descriptors: Other (Comment) (wrenching, like being stretched)  Pain Frequency: Constant/continuous  Pain Onset: Ongoing  Pain Type: Acute pain  Pain Location: Back  Pain Orientation: Left, Lower, Posterior  Pain Descriptors: Other (Comment) (wrenching, like being stretched)  Pain Frequency: Constant/continuous    Better with:  reclining, Advil, muscle relaxers    Worse with:  sitting, walking, standing, bending, lifting, in/out of car/shower/bed, sleeping    Medical History/Surgical History:  Medical Precautions:  (neck surgery 1998, L foot surgery history of, HTN, high cholesterol, RA)    Reviewed medical history form with patient (medications/allergies reviewed with patient).  Current Outpatient Medications   Medication Instructions    albuterol 90 mcg/actuation inhaler 2 puffs, inhalation, Every 6 hours PRN    aspirin (Adult Low Dose Aspirin) 81 mg EC tablet 1 tablet, Daily    calcium carbonate-vitamin D3 600 mg-5 mcg (200 unit) tablet 1 tablet, Daily    cholecalciferol (VITAMIN D-3) 2,000 Units, Daily    cyanocobalamin (VITAMIN B-12) 250 mcg, Daily    cyclobenzaprine (FLEXERIL) 10 mg, oral, 3 times daily PRN    cyclobenzaprine (FLEXERIL) 10 mg, oral, 3 times daily PRN    diazePAM (VALIUM) 5 mg, oral, Every 8 hours PRN    EPINEPHrine 0.3 mg/0.3 mL injection syringe 1 Syringe, Once as needed    fish oil concentrate (Omega-3) 120-180 mg capsule 1 capsule, Daily    fluticasone (Flonase) 50 mcg/actuation nasal spray 2 sprays, Daily    fluticasone propion-salmeteroL (Advair Diskus) 500-50 mcg/dose diskus inhaler 1 puff, inhalation, 2 times daily RT, Rinse mouth with water after use to reduce aftertaste and incidence of candidiasis. Do not swallow.    hydroCHLOROthiazide (MICROZIDE) 12.5 mg, oral, Daily    magnesium oxide (MAG-OX) 400 mg    methocarbamol (ROBAXIN) 500 mg, oral, 4 times daily PRN    montelukast (SINGULAIR) 10 mg, oral, Nightly    pantoprazole (PROTONIX) 40 mg, oral, Daily before breakfast     rosuvastatin (CRESTOR) 5 mg, oral, Daily    theanine 100 mg capsule 1 capsule, Every other day    vitamin E 400 Units, Daily     Radiology:    Exam Information    Status Exam Begun Exam Ended   Final 7/08/2025 14:39 7/08/2025 15:13     Study Result    Narrative & Impression   Interpreted By:  Marsha Pendleton and Stephens Katherine   STUDY:  MR LUMBAR SPINE WO IV CONTRAST;  7/8/2025 3:13 pm      INDICATION:  Signs/Symptoms:pain.      ,M54.40 Lumbago with sciatica, unspecified side,M54.16 Radiculopathy,  lumbar region,M51.26 Other intervertebral disc displacement, lumbar  region      COMPARISON:  CT lumbar spine 07/01/2025      ACCESSION NUMBER(S):  [id number]      ORDERING CLINICIAN:  COLE BUDINSKY      TECHNIQUE:  Sagittal T1, T2, STIR, axial T1 and T2 weighted images of the lumbar  spine were acquired.      FINDINGS:  There are 5 lumbar type vertebral bodies. The last rib-bearing  vertebra is labeled T12.      Alignment: The vertebral alignment is maintained. There is  straightening of the normal lumbar lordosis.      Vertebrae/Intervertebral Discs: The vertebral bodies demonstrate  expected height. A Schmorl's node is noted in the superior endplate  of L2, similar to prior. The marrow signal is within normal limits.  Mild loss of intervertebral disc height L3-4 and L4-5. there is  desiccated disc signal throughout the lower thoracic and lumbar spine.      Conus: The lower thoracic cord appears unremarkable. The conus  terminates at L1-2.      There are degenerative changes at T10-T11 and T11-T12 without  high-grade spinal canal stenosis. No axial images were performed  through these levels.      T12-L1: No significant spinal canal or neural foraminal stenosis.      L1-2: No significant spinal canal or neural foraminal stenosis.  Minimal disc bulge.      L2-3: Disc bulge and facet arthropathy without significant spinal  canal or neural foraminal stenosis.      L3-4: Disc bulge and left-greater-than-right facet  arthropathy  causing mild-to-moderate left neural foraminal stenosis. No  significant spinal canal stenosis. No significant right neural  foraminal stenosis.      L4-5: Disc bulge, ligamentum flavum hypertrophy, and facet arthrosis  causing mild spinal canal stenosis and mild narrowing the  left-greater-than-right subarticular recesses. There is mild  bilateral neural foraminal stenosis.      L5-S1:  Disc bulge, ligamentum flavum hypertrophy, and facet  arthrosis without significant spinal canal or neural foraminal  stenosis.      The prevertebral and posterior paraspinous soft tissues are  unremarkable.      IMPRESSION:  Mild degenerative changes of the lumbar spine most pronounced at  L4-L5 with mild spinal canal stenosis and mild bilateral neural  foraminal stenosis.     For lower back/neck only:  Previous PT:  NO  Previous chiropractic:  NO  Previous acupuncture:  NO  Previous pain management:  NO  Lower back surgery:  NO  Neck surgery:  YES    Functional Assessment:  Level of [place]:  Level of [place]: Independent with ADLs and functional transfers    Social History:    Occupation:  Admin  Work Status:  EMPLOYED  Patient Awareness:  Patient is aware of HER diagnosis and prognosis.  Social Support/History:  LIVES ALONE    Objective:    Weightbearing Status:  WBAT L LE    Skin:  skin intact over lower back    Palpation:   point tenderness over L lumbar paraspinals    Sensation:  Patient denies numbness/tingling of bilateral LOWER extremities at present time.    Gait:  Moderate antalgic gait L LE    ROM:  AROM  Lumbar flexion 20 degrees with pain  Extension 10 degrees with lower back pain  SB R end range pain  L end range pain and L LE pain  Rotation 50% reduced R and L  L hip AROM  Flexion 100 degrees with pain  ER 25 degrees with pain  IR limited  R knee AROM WNL's, L knee AROM WNL's, R ankle/foot AROM WNL's, and L ankle/foot AROM WNL's    Strength:    L hip flexion 4-/5 with pain  L quadriceps 4-/5  with pain  L hamstrings 4-/5 with pain  R knee 5/5 all planes, R hip 5/5 all planes, R ankle/foot 5/5 all planes, and L ankle/foot 5/5 all planes    Special Tests:  + SLR on L, + slump test on L    Outcome measure     Oswestry Disablity Index (ELDER): 33     Treatment  Time in clinic started at  8:30am  Time in clinic ended at  9am  Total time in clinic is . 30 minutes  Total timed code time is  30 minutes    Treatment Performed Today:.   PT Initial Evaluation   Diagnosis and Precautions: M54.40 (ICD-10-CM) - Back pain of lumbosacral region with sciatica  M54.16 (ICD-10-CM) - Lumbar radiculopathy  M51.26 (ICD-10-CM) - Lumbar herniated disc

## 2025-07-16 ENCOUNTER — APPOINTMENT (OUTPATIENT)
Dept: PHYSICAL THERAPY | Facility: CLINIC | Age: 60
End: 2025-07-16
Payer: COMMERCIAL

## 2025-07-16 DIAGNOSIS — M54.16 LUMBAR RADICULOPATHY: ICD-10-CM

## 2025-07-16 DIAGNOSIS — M51.26 LUMBAR HERNIATED DISC: ICD-10-CM

## 2025-07-16 DIAGNOSIS — M54.40 BACK PAIN OF LUMBOSACRAL REGION WITH SCIATICA: ICD-10-CM

## 2025-07-17 ENCOUNTER — APPOINTMENT (OUTPATIENT)
Dept: ORTHOPEDIC SURGERY | Facility: CLINIC | Age: 60
End: 2025-07-17
Payer: COMMERCIAL

## 2025-07-17 DIAGNOSIS — M54.16 LUMBAR RADICULOPATHY: Primary | ICD-10-CM

## 2025-07-17 RX ORDER — CYCLOBENZAPRINE HCL 10 MG
10 TABLET ORAL 3 TIMES DAILY PRN
Qty: 30 TABLET | Refills: 0 | Status: SHIPPED | OUTPATIENT
Start: 2025-07-17 | End: 2025-07-28

## 2025-07-17 NOTE — PROGRESS NOTES
New patient established to the practice comes the office complaining of symptoms of low back pain left leg symptoms.  Sudden onset kind of came on no trauma accidents or falls causing no bowel or bladder complaints no saddle anesthesia.  She saw Dr. Budinsky ordered some therapy gave her some steroids and muscle relaxants which is relieving her symptoms significantly.  No bowel or bladder complaints no saddle anesthesia no gait or balance changes no trauma accidents or falls to cause it no spine surgeries in the past.  She went to physical therapy at  they did not have a traction table so she is going to [facility] in [facility].    I looked at patient's recent blood work.  We checked a vitamin D level it was 97.  I looked at a hemoglobin A1c the last 1 that was done was 5.6 we looked at primary doctors know check a medication list see if she is on a statin because muscular aches and pain she is taking Crestor as far as a blood thinner I see aspirin only.  She has a BMI of 31    Physical exam: Well-nourished, well kept.  No lymphangitis or lymphadenopathy in the examined extremities.  Good perfusion to the extremities ×4.  Radial and dorsalis pedis pulses 2+.  Capillary refill to all 4 digits brisk.  No distal edema x 4.  Patient ambulating with no major difficulty.  Full range of motion cervical spine and observation no instability.  Mild decreased range of motion flexion-extension rotation lumbar spine good strength no instability.  Patient moving upper extremities by difficulty motor strength intact.  Examination of the lower extremities reveals no point tenderness, swelling, or deformity.  Range of motion of the hips, knees, and ankles are full without crepitance, instability, or exacerbation of pain.  Strength is 5/5 throughout.  No redness, abrasions, or lesions on all 4 extremities, head and neck, or trunk.  Patient neurologically intact    CT scan CT scan was reviewed report reviewed as well showing some  arthritic degenerative changes.  Not that bad no fractures dislocations bony lytic lesions.  She had an MRI which was reviewed report reviewed as well showing some mild to moderate central stenosis at L3-4 with some bilateral foraminal stenosis worse on the left.  L4-5 shows some central stenosis.  Lateral recess stenosis and some left-sided foraminal stenosis also which is mild to moderate.    Assessment: This the patient was having back pain and left leg symptoms acute which is affecting her daily bodily function.  That are resolving with medications and rest.  Patient scheduled to go to [facility] for physical therapy.    Plan: I gave her another prescription for physical therapy to take to [facility] one of the do some traction therapy and if she still has leg pain or it is bothering her and she wants to try an epidural injection which I talked to her extensively about the risks and benefits.  Then we will set her up with comprehensive pain for an epidural injection L3-4 L4-5 on the left

## 2025-07-18 ENCOUNTER — APPOINTMENT (OUTPATIENT)
Dept: RADIOLOGY | Facility: HOSPITAL | Age: 60
End: 2025-07-18
Payer: COMMERCIAL

## 2025-07-21 ENCOUNTER — APPOINTMENT (OUTPATIENT)
Dept: PRIMARY CARE | Facility: CLINIC | Age: 60
End: 2025-07-21
Payer: COMMERCIAL

## 2025-07-21 ENCOUNTER — APPOINTMENT (OUTPATIENT)
Dept: PHYSICAL THERAPY | Facility: CLINIC | Age: 60
End: 2025-07-21
Payer: COMMERCIAL

## 2025-07-21 DIAGNOSIS — M54.16 LUMBAR RADICULOPATHY: ICD-10-CM

## 2025-07-21 DIAGNOSIS — M51.26 LUMBAR HERNIATED DISC: ICD-10-CM

## 2025-07-21 DIAGNOSIS — M54.40 BACK PAIN OF LUMBOSACRAL REGION WITH SCIATICA: ICD-10-CM

## 2025-07-23 ENCOUNTER — APPOINTMENT (OUTPATIENT)
Dept: PHYSICAL THERAPY | Facility: CLINIC | Age: 60
End: 2025-07-23
Payer: COMMERCIAL

## 2025-07-23 DIAGNOSIS — M51.26 LUMBAR HERNIATED DISC: ICD-10-CM

## 2025-07-23 DIAGNOSIS — M54.16 LUMBAR RADICULOPATHY: ICD-10-CM

## 2025-07-23 DIAGNOSIS — M54.40 BACK PAIN OF LUMBOSACRAL REGION WITH SCIATICA: ICD-10-CM

## 2025-07-25 ENCOUNTER — TELEPHONE (OUTPATIENT)
Dept: ORTHOPEDIC SURGERY | Facility: CLINIC | Age: 60
End: 2025-07-25
Payer: COMMERCIAL

## 2025-07-25 DIAGNOSIS — M54.16 LUMBAR RADICULOPATHY: Primary | ICD-10-CM

## 2025-07-25 NOTE — TELEPHONE ENCOUNTER
Returned call to patient. Explained the pain management referral was placed due to patient asking about injections in her lumbar region. Patient stated understanding and was thankful for the quick response.

## 2025-07-25 NOTE — TELEPHONE ENCOUNTER
Patient called and left a .  She states she would like to proceed with the injection that was discussed at her last visit.  She has done two sessions of PT and her pain has increased.  She is also inquiring about a pain management referral that she sees in her chart, she is not sure what this is for and was not aware of being referred.   Please contact patient at [phone].

## 2025-07-29 ENCOUNTER — TELEPHONE (OUTPATIENT)
Dept: ORTHOPEDIC SURGERY | Facility: CLINIC | Age: 60
End: 2025-07-29
Payer: COMMERCIAL

## 2025-07-30 ENCOUNTER — APPOINTMENT (OUTPATIENT)
Dept: PHYSICAL THERAPY | Facility: CLINIC | Age: 60
End: 2025-07-30
Payer: COMMERCIAL

## 2025-07-30 DIAGNOSIS — M51.26 LUMBAR HERNIATED DISC: ICD-10-CM

## 2025-07-30 DIAGNOSIS — M54.40 BACK PAIN OF LUMBOSACRAL REGION WITH SCIATICA: ICD-10-CM

## 2025-07-30 DIAGNOSIS — M54.16 LUMBAR RADICULOPATHY: ICD-10-CM

## 2025-07-31 ENCOUNTER — INITIAL CONSULT (OUTPATIENT)
Age: 60
End: 2025-07-31
Payer: COMMERCIAL

## 2025-07-31 VITALS
HEART RATE: 89 BPM | TEMPERATURE: 97.5 F | DIASTOLIC BLOOD PRESSURE: 86 MMHG | SYSTOLIC BLOOD PRESSURE: 136 MMHG | HEIGHT: 62 IN | WEIGHT: 175 LBS | OXYGEN SATURATION: 98 % | BODY MASS INDEX: 32.2 KG/M2

## 2025-07-31 DIAGNOSIS — G89.4 CHRONIC PAIN SYNDROME: ICD-10-CM

## 2025-07-31 DIAGNOSIS — M54.16 LUMBAR RADICULOPATHY: Primary | ICD-10-CM

## 2025-07-31 DIAGNOSIS — M47.816 LUMBAR SPONDYLOSIS: ICD-10-CM

## 2025-07-31 PROCEDURE — 3017F COLORECTAL CA SCREEN DOC REV: CPT | Performed by: PAIN MEDICINE

## 2025-07-31 PROCEDURE — G8417 CALC BMI ABV UP PARAM F/U: HCPCS | Performed by: PAIN MEDICINE

## 2025-07-31 PROCEDURE — G8427 DOCREV CUR MEDS BY ELIG CLIN: HCPCS | Performed by: PAIN MEDICINE

## 2025-07-31 PROCEDURE — 99204 OFFICE O/P NEW MOD 45 MIN: CPT | Performed by: PAIN MEDICINE

## 2025-07-31 PROCEDURE — 1036F TOBACCO NON-USER: CPT | Performed by: PAIN MEDICINE

## 2025-07-31 RX ORDER — PANTOPRAZOLE SODIUM 40 MG/1
40 TABLET, DELAYED RELEASE ORAL
COMMUNITY
Start: 2020-03-01 | End: 2026-03-27

## 2025-07-31 RX ORDER — CYCLOBENZAPRINE HCL 10 MG
10 TABLET ORAL 3 TIMES DAILY PRN
Qty: 90 TABLET | Refills: 0 | Status: SHIPPED | OUTPATIENT
Start: 2025-07-31 | End: 2025-08-30

## 2025-07-31 RX ORDER — FLUTICASONE PROPIONATE 50 MCG
SPRAY, SUSPENSION (ML) NASAL
COMMUNITY

## 2025-07-31 RX ORDER — FLUTICASONE PROPIONATE AND SALMETEROL 500; 50 UG/1; UG/1
1 POWDER RESPIRATORY (INHALATION)
COMMUNITY
Start: 2025-03-27 | End: 2026-03-27

## 2025-07-31 RX ORDER — VITAMIN E 268 MG
400 CAPSULE ORAL DAILY
COMMUNITY

## 2025-07-31 RX ORDER — THEANINE 100 MG
CAPSULE ORAL
COMMUNITY
Start: 2020-07-01

## 2025-07-31 RX ORDER — KETOROLAC TROMETHAMINE 10 MG/1
TABLET, FILM COATED ORAL
COMMUNITY
Start: 2025-07-01

## 2025-07-31 RX ORDER — MONTELUKAST SODIUM 10 MG/1
10 TABLET ORAL NIGHTLY
COMMUNITY
Start: 2020-03-01

## 2025-07-31 RX ORDER — MECLIZINE HYDROCHLORIDE 25 MG/1
TABLET ORAL PRN
COMMUNITY

## 2025-07-31 RX ORDER — CYCLOBENZAPRINE HCL 10 MG
10 TABLET ORAL 3 TIMES DAILY PRN
COMMUNITY
Start: 2025-07-01

## 2025-07-31 RX ORDER — ALBUTEROL SULFATE 90 UG/1
2 INHALANT RESPIRATORY (INHALATION) EVERY 6 HOURS PRN
COMMUNITY
Start: 2025-01-29 | End: 2026-02-05

## 2025-07-31 NOTE — PROGRESS NOTES
[facility]  P: [phone]  F: [phone]        Terri Navarro  (1965)    7/31/2025    Subjective:     Terri Navarro is 59 y.o. female who complains today of:     Chief Complaint   Patient presents with    Consultation    Back Pain    Leg Pain     Left      Patient here today for initial evaluation.  I reviewed Wilmer Rodriguez notes.  Patient here with her son history of neck surgery many many years ago with Dr. Sultana.  She has low back pain pain down left leg into the thigh.  Bending bothers her laying is better she works a desk job.  She is not diabetic she is on baby aspirin she does not smoke pains been going on for a long time flared up over the last 1 month.  She had an MRI from earlier this month I reviewed the report in detail.  The pain affects her quality life.  She tried physical therapy.  She is not on narcotics.  OARRS was reviewed.    Assessment: Chronic pain syndrome, lumbar radiculopathy, lumbar spondylosis, history of remote back surgery    Plan: We discussed option detail.  Like to authorize left L3-4, L4-5 transforaminal epidural injections under fluoroscopic guidance.  NRS pain level can be 8 out of 10.  She will have to stop aspirin 1 week prior.  I showed her anatomic pathology.  Answered all questions chart was reviewed.  MRI reviewed in detail.  OARRS is reviewed she does not need narcotics.  She has failed Concerta treatment.  She is in agreement with plan.    Allergies:  Patient has no known allergies.    No past medical history on file.  Past Surgical History:   Procedure Laterality Date    BACK SURGERY       Family History   Problem Relation Age of Onset    Diabetes Mother     Arthritis Mother     Heart Disease Mother     High Blood Pressure Mother     Diabetes Father     High Blood Pressure Father     Heart Disease Father     Arthritis Father      Social History     Socioeconomic History    Marital

## 2025-08-01 ENCOUNTER — TELEPHONE (OUTPATIENT)
Age: 60
End: 2025-08-01

## 2025-08-12 ENCOUNTER — PATIENT MESSAGE (OUTPATIENT)
Age: 60
End: 2025-08-12

## 2025-08-18 ENCOUNTER — HOSPITAL ENCOUNTER (OUTPATIENT)
Age: 60
Discharge: HOME | End: 2025-08-18
Payer: COMMERCIAL

## 2025-08-18 ENCOUNTER — HOSPITAL ENCOUNTER (OUTPATIENT)
Age: 60
Discharge: HOME | End: 2025-08-18
Attending: PAIN MEDICINE
Payer: COMMERCIAL

## 2025-08-18 VITALS
HEART RATE: 87 BPM | DIASTOLIC BLOOD PRESSURE: 82 MMHG | TEMPERATURE: 97.4 F | BODY MASS INDEX: 32.2 KG/M2 | WEIGHT: 175 LBS | HEIGHT: 62 IN | OXYGEN SATURATION: 98 % | SYSTOLIC BLOOD PRESSURE: 130 MMHG

## 2025-08-18 DIAGNOSIS — R52 PAIN: ICD-10-CM

## 2025-08-18 DIAGNOSIS — M54.16 LUMBAR RADICULOPATHY: ICD-10-CM

## 2025-08-18 PROCEDURE — 2500000003 HC RX 250 WO HCPCS: Performed by: PAIN MEDICINE

## 2025-08-18 PROCEDURE — 64484 NJX AA&/STRD TFRM EPI L/S EA: CPT | Performed by: PAIN MEDICINE

## 2025-08-18 PROCEDURE — 6360000002 HC RX W HCPCS: Performed by: PAIN MEDICINE

## 2025-08-18 PROCEDURE — 64483 NJX AA&/STRD TFRM EPI L/S 1: CPT | Performed by: PAIN MEDICINE

## 2025-08-18 PROCEDURE — 6360000004 HC RX CONTRAST MEDICATION: Performed by: PAIN MEDICINE

## 2025-08-18 PROCEDURE — 64483 NJX AA&/STRD TFRM EPI L/S 1: CPT

## 2025-08-18 PROCEDURE — 64484 NJX AA&/STRD TFRM EPI L/S EA: CPT

## 2025-08-18 RX ORDER — DEXAMETHASONE SODIUM PHOSPHATE 10 MG/ML
5 INJECTION, SOLUTION INTRAMUSCULAR; INTRAVENOUS ONCE
Status: COMPLETED | OUTPATIENT
Start: 2025-08-18 | End: 2025-08-18

## 2025-08-18 RX ORDER — SODIUM CHLORIDE 9 MG/ML
1 INJECTION, SOLUTION INTRAMUSCULAR; INTRAVENOUS; SUBCUTANEOUS ONCE
Status: COMPLETED | OUTPATIENT
Start: 2025-08-18 | End: 2025-08-18

## 2025-08-18 RX ADMIN — IOHEXOL 1 ML: 300 INJECTION, SOLUTION INTRAVENOUS at 09:02

## 2025-08-18 RX ADMIN — SODIUM CHLORIDE 1 ML: 9 INJECTION INTRAMUSCULAR; INTRAVENOUS; SUBCUTANEOUS at 09:02

## 2025-08-18 RX ADMIN — DEXAMETHASONE SODIUM PHOSPHATE 5 MG: 10 INJECTION, SOLUTION INTRAMUSCULAR; INTRAVENOUS at 08:59

## 2025-08-18 ASSESSMENT — PAIN SCALES - GENERAL
PAINLEVEL_OUTOF10: 2
PAINLEVEL_OUTOF10: 2

## 2025-08-18 ASSESSMENT — PAIN DESCRIPTION - LOCATION
LOCATION: BACK
LOCATION: BACK

## 2025-09-04 ENCOUNTER — OFFICE VISIT (OUTPATIENT)
Age: 60
End: 2025-09-04
Payer: COMMERCIAL

## 2025-09-04 VITALS
OXYGEN SATURATION: 97 % | HEART RATE: 67 BPM | HEIGHT: 62 IN | WEIGHT: 168 LBS | TEMPERATURE: 97.7 F | DIASTOLIC BLOOD PRESSURE: 76 MMHG | SYSTOLIC BLOOD PRESSURE: 136 MMHG | BODY MASS INDEX: 30.91 KG/M2

## 2025-09-04 DIAGNOSIS — M47.816 LUMBAR SPONDYLOSIS: Primary | ICD-10-CM

## 2025-09-04 PROCEDURE — G8427 DOCREV CUR MEDS BY ELIG CLIN: HCPCS | Performed by: PAIN MEDICINE

## 2025-09-04 PROCEDURE — 99214 OFFICE O/P EST MOD 30 MIN: CPT | Performed by: PAIN MEDICINE

## 2025-09-04 PROCEDURE — 1036F TOBACCO NON-USER: CPT | Performed by: PAIN MEDICINE

## 2025-09-04 PROCEDURE — G8417 CALC BMI ABV UP PARAM F/U: HCPCS | Performed by: PAIN MEDICINE

## 2025-09-04 PROCEDURE — 3017F COLORECTAL CA SCREEN DOC REV: CPT | Performed by: PAIN MEDICINE

## 2025-10-30 ENCOUNTER — APPOINTMENT (OUTPATIENT)
Dept: PRIMARY CARE | Facility: CLINIC | Age: 60
End: 2025-10-30
Payer: COMMERCIAL

## 2025-12-10 ENCOUNTER — APPOINTMENT (OUTPATIENT)
Dept: RADIOLOGY | Facility: HOSPITAL | Age: 60
End: 2025-12-10
Payer: COMMERCIAL

## 2025-12-10 DIAGNOSIS — Z12.31 SCREENING MAMMOGRAM FOR BREAST CANCER: ICD-10-CM

## (undated) DEVICE — Device

## (undated) DEVICE — PROTECTOR, NERVE, ULNAR, PINK

## (undated) DEVICE — TOWELS 4-PK

## (undated) DEVICE — STRAP, VELCRO, BODY, 4 X 60IN, NS

## (undated) DEVICE — SYRINGE, CONTROL, ANGIOGRAPHIC, FIXED MALE LUER, 10 CC

## (undated) DEVICE — BLADE, BEAVER, MINI, 15, STAINLESS STEEL

## (undated) DEVICE — NEEDLE, SAFETY, 25 GA X 1.5 IN

## (undated) DEVICE — GOWN, SURGICAL, ROYAL SILK, LG, STERILE

## (undated) DEVICE — DRAPE, SHEET, 17 X 23 IN

## (undated) DEVICE — TRAY, SKIN SCRUB, WET PREP, WITH 4 COMPARMENT

## (undated) DEVICE — GLOVE, SURGICAL, PROTEXIS PI , 7.5, PF, LF

## (undated) DEVICE — DRAPE, SHEET, XL

## (undated) DEVICE — REST, HEAD, BAGEL, 9 IN

## (undated) DEVICE — CUP, MEDICINE, GRADUATED, 2 OZ, PLASTIC, DISP, LF

## (undated) DEVICE — NEEDLE, SAFETY, 18 G X 1.5 IN